# Patient Record
Sex: FEMALE | Race: WHITE
[De-identification: names, ages, dates, MRNs, and addresses within clinical notes are randomized per-mention and may not be internally consistent; named-entity substitution may affect disease eponyms.]

---

## 2017-04-13 ENCOUNTER — MESSAGE (OUTPATIENT)
Age: 82
End: 2017-04-13

## 2017-07-25 ENCOUNTER — RX RENEWAL (OUTPATIENT)
Age: 82
End: 2017-07-25

## 2017-10-02 ENCOUNTER — RX RENEWAL (OUTPATIENT)
Age: 82
End: 2017-10-02

## 2017-11-02 ENCOUNTER — APPOINTMENT (OUTPATIENT)
Dept: FAMILY MEDICINE | Facility: CLINIC | Age: 82
End: 2017-11-02
Payer: MEDICARE

## 2017-11-02 ENCOUNTER — NON-APPOINTMENT (OUTPATIENT)
Age: 82
End: 2017-11-02

## 2017-11-02 VITALS
TEMPERATURE: 98 F | BODY MASS INDEX: 16.42 KG/M2 | HEART RATE: 80 BPM | HEIGHT: 61 IN | WEIGHT: 87 LBS | RESPIRATION RATE: 12 BRPM | OXYGEN SATURATION: 98 % | DIASTOLIC BLOOD PRESSURE: 80 MMHG | SYSTOLIC BLOOD PRESSURE: 118 MMHG

## 2017-11-02 PROCEDURE — G0008: CPT

## 2017-11-02 PROCEDURE — 90662 IIV NO PRSV INCREASED AG IM: CPT

## 2017-11-02 PROCEDURE — 99214 OFFICE O/P EST MOD 30 MIN: CPT | Mod: 25

## 2017-11-02 PROCEDURE — 93000 ELECTROCARDIOGRAM COMPLETE: CPT

## 2017-11-06 LAB
25(OH)D3 SERPL-MCNC: 39.9 NG/ML
ALBUMIN SERPL ELPH-MCNC: 4.6 G/DL
ALP BLD-CCNC: 52 U/L
ALT SERPL-CCNC: 7 U/L
ANION GAP SERPL CALC-SCNC: 21 MMOL/L
AST SERPL-CCNC: 17 U/L
BASOPHILS # BLD AUTO: 0.01 K/UL
BASOPHILS NFR BLD AUTO: 0.2 %
BILIRUB SERPL-MCNC: 0.6 MG/DL
BUN SERPL-MCNC: 22 MG/DL
CALCIUM SERPL-MCNC: 9.7 MG/DL
CHLORIDE SERPL-SCNC: 101 MMOL/L
CHOLEST SERPL-MCNC: 203 MG/DL
CHOLEST/HDLC SERPL: 1.9 RATIO
CO2 SERPL-SCNC: 22 MMOL/L
CREAT SERPL-MCNC: 0.89 MG/DL
EOSINOPHIL # BLD AUTO: 0.06 K/UL
EOSINOPHIL NFR BLD AUTO: 1 %
FOLATE SERPL-MCNC: 6.6 NG/ML
GGT SERPL-CCNC: 10 U/L
GLUCOSE SERPL-MCNC: 88 MG/DL
HBA1C MFR BLD HPLC: 5.4 %
HCT VFR BLD CALC: 39.2 %
HDLC SERPL-MCNC: 105 MG/DL
HGB BLD-MCNC: 12.8 G/DL
IMM GRANULOCYTES NFR BLD AUTO: 0 %
IRON SERPL-MCNC: 74 UG/DL
LDLC SERPL CALC-MCNC: 86 MG/DL
LYMPHOCYTES # BLD AUTO: 1.21 K/UL
LYMPHOCYTES NFR BLD AUTO: 19.7 %
MAN DIFF?: NORMAL
MCHC RBC-ENTMCNC: 30.6 PG
MCHC RBC-ENTMCNC: 32.7 GM/DL
MCV RBC AUTO: 93.8 FL
MONOCYTES # BLD AUTO: 0.53 K/UL
MONOCYTES NFR BLD AUTO: 8.6 %
NEUTROPHILS # BLD AUTO: 4.33 K/UL
NEUTROPHILS NFR BLD AUTO: 70.5 %
PLATELET # BLD AUTO: 212 K/UL
POTASSIUM SERPL-SCNC: 4.3 MMOL/L
PROT SERPL-MCNC: 7.3 G/DL
RBC # BLD: 4.18 M/UL
RBC # FLD: 14 %
SODIUM SERPL-SCNC: 144 MMOL/L
TRIGL SERPL-MCNC: 62 MG/DL
TSH SERPL-ACNC: 3.4 UIU/ML
VIT B12 SERPL-MCNC: 410 PG/ML
WBC # FLD AUTO: 6.14 K/UL

## 2018-06-28 ENCOUNTER — RX RENEWAL (OUTPATIENT)
Age: 83
End: 2018-06-28

## 2018-07-17 ENCOUNTER — RX CHANGE (OUTPATIENT)
Age: 83
End: 2018-07-17

## 2018-11-05 ENCOUNTER — NON-APPOINTMENT (OUTPATIENT)
Age: 83
End: 2018-11-05

## 2018-11-05 ENCOUNTER — APPOINTMENT (OUTPATIENT)
Dept: FAMILY MEDICINE | Facility: CLINIC | Age: 83
End: 2018-11-05
Payer: MEDICARE

## 2018-11-05 VITALS
BODY MASS INDEX: 15.48 KG/M2 | OXYGEN SATURATION: 98 % | TEMPERATURE: 97.9 F | RESPIRATION RATE: 12 BRPM | HEIGHT: 61 IN | WEIGHT: 82 LBS | SYSTOLIC BLOOD PRESSURE: 138 MMHG | DIASTOLIC BLOOD PRESSURE: 70 MMHG | HEART RATE: 77 BPM

## 2018-11-05 PROCEDURE — 93000 ELECTROCARDIOGRAM COMPLETE: CPT

## 2018-11-05 PROCEDURE — 90662 IIV NO PRSV INCREASED AG IM: CPT

## 2018-11-05 PROCEDURE — 36415 COLL VENOUS BLD VENIPUNCTURE: CPT

## 2018-11-05 PROCEDURE — 99397 PER PM REEVAL EST PAT 65+ YR: CPT | Mod: 25

## 2018-11-05 PROCEDURE — G0008: CPT

## 2018-11-05 RX ORDER — DICLOFENAC SODIUM 10 MG/G
1 GEL TOPICAL
Qty: 1 | Refills: 0 | Status: ACTIVE | COMMUNITY
Start: 2018-11-05 | End: 1900-01-01

## 2018-11-05 NOTE — HISTORY OF PRESENT ILLNESS
[de-identified] : Patient presents to office for routine physical exam accompanied by her daughter. Patient states she is eating less has less appetite and does have difficulty with digesting her food. Patient states she has to eat very slowly and foot that are soft otherwise cannot digest. Has lost weight over the last year or 2. Patient states that she has pain all over from the top of her head to her ankles mostly at night when she sleeps. No redness or swelling of joints.

## 2018-11-05 NOTE — ASSESSMENT
[FreeTextEntry1] : Patient presents to office for routine physical exam accompanied by her daughter. Patient states she is eating less has less appetite and does have difficulty with digesting her food. Patient states she has to eat very slowly and foot that are soft otherwise cannot digest. Has lost weight over the last year or 2. Patient states that she has pain all over from the top of her head to her ankles mostly at night when she sleeps. No redness or swelling of joints.\par \par EKG No change\par PE labs drawn today\par High dose flu shot given today\par referred for Shingrix

## 2018-11-08 LAB
25(OH)D3 SERPL-MCNC: 29.8 NG/ML
ALBUMIN SERPL ELPH-MCNC: 4.7 G/DL
ALP BLD-CCNC: 49 U/L
ALT SERPL-CCNC: 9 U/L
ANION GAP SERPL CALC-SCNC: 16 MMOL/L
AST SERPL-CCNC: 16 U/L
BASOPHILS # BLD AUTO: 0.01 K/UL
BASOPHILS NFR BLD AUTO: 0.2 %
BILIRUB SERPL-MCNC: 0.6 MG/DL
BUN SERPL-MCNC: 14 MG/DL
CALCIUM SERPL-MCNC: 9.7 MG/DL
CHLORIDE SERPL-SCNC: 104 MMOL/L
CHOLEST SERPL-MCNC: 272 MG/DL
CHOLEST/HDLC SERPL: 2.9 RATIO
CO2 SERPL-SCNC: 26 MMOL/L
CREAT SERPL-MCNC: 0.8 MG/DL
EOSINOPHIL # BLD AUTO: 0.03 K/UL
EOSINOPHIL NFR BLD AUTO: 0.5 %
FOLATE SERPL-MCNC: 5.2 NG/ML
GGT SERPL-CCNC: 15 U/L
GLUCOSE SERPL-MCNC: 92 MG/DL
HBA1C MFR BLD HPLC: 5.3 %
HCT VFR BLD CALC: 40.8 %
HDLC SERPL-MCNC: 93 MG/DL
HGB BLD-MCNC: 13.1 G/DL
IMM GRANULOCYTES NFR BLD AUTO: 0.2 %
IRON SERPL-MCNC: 94 UG/DL
LDLC SERPL CALC-MCNC: 162 MG/DL
LYMPHOCYTES # BLD AUTO: 1.41 K/UL
LYMPHOCYTES NFR BLD AUTO: 25.4 %
MAN DIFF?: NORMAL
MCHC RBC-ENTMCNC: 30.9 PG
MCHC RBC-ENTMCNC: 32.1 GM/DL
MCV RBC AUTO: 96.2 FL
MONOCYTES # BLD AUTO: 0.45 K/UL
MONOCYTES NFR BLD AUTO: 8.1 %
NEUTROPHILS # BLD AUTO: 3.64 K/UL
NEUTROPHILS NFR BLD AUTO: 65.6 %
PLATELET # BLD AUTO: 209 K/UL
POTASSIUM SERPL-SCNC: 4.6 MMOL/L
PROT SERPL-MCNC: 7.4 G/DL
RBC # BLD: 4.24 M/UL
RBC # FLD: 14.2 %
SODIUM SERPL-SCNC: 146 MMOL/L
TRIGL SERPL-MCNC: 86 MG/DL
TSH SERPL-ACNC: 3.68 UIU/ML
VIT B12 SERPL-MCNC: 438 PG/ML
WBC # FLD AUTO: 5.55 K/UL

## 2019-09-02 ENCOUNTER — RX RENEWAL (OUTPATIENT)
Age: 84
End: 2019-09-02

## 2019-11-12 ENCOUNTER — APPOINTMENT (OUTPATIENT)
Dept: FAMILY MEDICINE | Facility: CLINIC | Age: 84
End: 2019-11-12
Payer: MEDICARE

## 2019-11-12 ENCOUNTER — NON-APPOINTMENT (OUTPATIENT)
Age: 84
End: 2019-11-12

## 2019-11-12 VITALS
WEIGHT: 88 LBS | HEIGHT: 61 IN | HEART RATE: 74 BPM | TEMPERATURE: 97.9 F | RESPIRATION RATE: 16 BRPM | OXYGEN SATURATION: 99 % | DIASTOLIC BLOOD PRESSURE: 70 MMHG | SYSTOLIC BLOOD PRESSURE: 142 MMHG | BODY MASS INDEX: 16.62 KG/M2

## 2019-11-12 PROCEDURE — G0439: CPT | Mod: 25

## 2019-11-12 PROCEDURE — 90662 IIV NO PRSV INCREASED AG IM: CPT

## 2019-11-12 PROCEDURE — 93000 ELECTROCARDIOGRAM COMPLETE: CPT

## 2019-11-12 PROCEDURE — G0008: CPT

## 2019-11-12 PROCEDURE — 36415 COLL VENOUS BLD VENIPUNCTURE: CPT

## 2019-11-19 LAB
25(OH)D3 SERPL-MCNC: 37.7 NG/ML
ALBUMIN SERPL ELPH-MCNC: 4.9 G/DL
ALP BLD-CCNC: 41 U/L
ALT SERPL-CCNC: 6 U/L
ANION GAP SERPL CALC-SCNC: 16 MMOL/L
APPEARANCE: CLEAR
AST SERPL-CCNC: 14 U/L
BACTERIA: NEGATIVE
BASOPHILS # BLD AUTO: 0.03 K/UL
BASOPHILS NFR BLD AUTO: 0.5 %
BILIRUB SERPL-MCNC: 0.7 MG/DL
BILIRUBIN URINE: NEGATIVE
BLOOD URINE: NORMAL
BUN SERPL-MCNC: 21 MG/DL
CALCIUM SERPL-MCNC: 10 MG/DL
CHLORIDE SERPL-SCNC: 103 MMOL/L
CHOLEST SERPL-MCNC: 315 MG/DL
CHOLEST/HDLC SERPL: 3 RATIO
CO2 SERPL-SCNC: 24 MMOL/L
COLOR: YELLOW
CREAT SERPL-MCNC: 0.94 MG/DL
EOSINOPHIL # BLD AUTO: 0.06 K/UL
EOSINOPHIL NFR BLD AUTO: 1 %
ESTIMATED AVERAGE GLUCOSE: 108 MG/DL
FOLATE SERPL-MCNC: 3 NG/ML
GLUCOSE QUALITATIVE U: NEGATIVE
GLUCOSE SERPL-MCNC: 92 MG/DL
HBA1C MFR BLD HPLC: 5.4 %
HCT VFR BLD CALC: 41.8 %
HDLC SERPL-MCNC: 104 MG/DL
HGB BLD-MCNC: 13.2 G/DL
HYALINE CASTS: 2 /LPF
IMM GRANULOCYTES NFR BLD AUTO: 0.3 %
KETONES URINE: NEGATIVE
LDLC SERPL CALC-MCNC: 196 MG/DL
LEUKOCYTE ESTERASE URINE: NEGATIVE
LYMPHOCYTES # BLD AUTO: 1.45 K/UL
LYMPHOCYTES NFR BLD AUTO: 24.8 %
MAN DIFF?: NORMAL
MCHC RBC-ENTMCNC: 31.5 PG
MCHC RBC-ENTMCNC: 31.6 GM/DL
MCV RBC AUTO: 99.8 FL
MICROSCOPIC-UA: NORMAL
MONOCYTES # BLD AUTO: 0.56 K/UL
MONOCYTES NFR BLD AUTO: 9.6 %
NEUTROPHILS # BLD AUTO: 3.72 K/UL
NEUTROPHILS NFR BLD AUTO: 63.8 %
NITRITE URINE: NEGATIVE
PH URINE: 5.5
PLATELET # BLD AUTO: 189 K/UL
POTASSIUM SERPL-SCNC: 4.5 MMOL/L
PROT SERPL-MCNC: 7.5 G/DL
PROTEIN URINE: NORMAL
RBC # BLD: 4.19 M/UL
RBC # FLD: 13.7 %
RED BLOOD CELLS URINE: 3 /HPF
SODIUM SERPL-SCNC: 143 MMOL/L
SPECIFIC GRAVITY URINE: 1.02
SQUAMOUS EPITHELIAL CELLS: 1 /HPF
TRIGL SERPL-MCNC: 76 MG/DL
TSH SERPL-ACNC: 2.39 UIU/ML
UROBILINOGEN URINE: NORMAL
VIT B12 SERPL-MCNC: 437 PG/ML
WBC # FLD AUTO: 5.84 K/UL
WHITE BLOOD CELLS URINE: 1 /HPF

## 2020-11-16 ENCOUNTER — APPOINTMENT (OUTPATIENT)
Dept: FAMILY MEDICINE | Facility: CLINIC | Age: 85
End: 2020-11-16

## 2021-04-05 ENCOUNTER — APPOINTMENT (OUTPATIENT)
Dept: FAMILY MEDICINE | Facility: CLINIC | Age: 86
End: 2021-04-05
Payer: MEDICARE

## 2021-04-05 DIAGNOSIS — Z28.9 IMMUNIZATION NOT CARRIED OUT FOR UNSPECIFIED REASON: ICD-10-CM

## 2021-04-05 PROCEDURE — 99442: CPT

## 2022-03-31 ENCOUNTER — APPOINTMENT (OUTPATIENT)
Dept: FAMILY MEDICINE | Facility: CLINIC | Age: 87
End: 2022-03-31
Payer: MEDICARE

## 2022-03-31 VITALS — TEMPERATURE: 98.9 F | DIASTOLIC BLOOD PRESSURE: 60 MMHG | SYSTOLIC BLOOD PRESSURE: 118 MMHG

## 2022-03-31 DIAGNOSIS — M25.551 PAIN IN RIGHT HIP: ICD-10-CM

## 2022-03-31 DIAGNOSIS — T14.8XXA OTHER INJURY OF UNSPECIFIED BODY REGION, INITIAL ENCOUNTER: ICD-10-CM

## 2022-03-31 DIAGNOSIS — E78.5 HYPERLIPIDEMIA, UNSPECIFIED: ICD-10-CM

## 2022-03-31 DIAGNOSIS — Z00.00 ENCOUNTER FOR GENERAL ADULT MEDICAL EXAMINATION W/OUT ABNORMAL FINDINGS: ICD-10-CM

## 2022-03-31 PROCEDURE — 99215 OFFICE O/P EST HI 40 MIN: CPT

## 2022-03-31 RX ORDER — LIDOCAINE 5% 700 MG/1
5 PATCH TOPICAL
Qty: 1 | Refills: 1 | Status: ACTIVE | COMMUNITY
Start: 2022-03-31 | End: 1900-01-01

## 2022-04-01 PROBLEM — M25.551 RIGHT-SIDED ISCHIAL PAIN: Status: ACTIVE | Noted: 2022-04-01

## 2022-04-01 NOTE — PHYSICAL EXAM
[Normal] : affect was normal and insight and judgment were intact [de-identified] : TTP right ischium no redness or bruise noted [de-identified] : limp while ambulating

## 2022-04-01 NOTE — HISTORY OF PRESENT ILLNESS
[FreeTextEntry8] : 89y/o F with PMHx of HTN (Losartan), HLD, and Arthritis presents to the office accompanied by her daughter Ximena after  sustaining a fall 10 days ago . pt states she fell onto her right side in her kitchen. Continues to have pain in right buttock. Painful to walk . No swellingor bruising noted on back or hip. Had right rib pain now resolved

## 2022-04-02 LAB
25(OH)D3 SERPL-MCNC: 30 NG/ML
ALBUMIN SERPL ELPH-MCNC: 4.5 G/DL
ALP BLD-CCNC: 188 U/L
ALT SERPL-CCNC: 7 U/L
ANION GAP SERPL CALC-SCNC: 17 MMOL/L
AST SERPL-CCNC: 17 U/L
BASOPHILS # BLD AUTO: 0.02 K/UL
BASOPHILS NFR BLD AUTO: 0.3 %
BILIRUB SERPL-MCNC: 0.7 MG/DL
BUN SERPL-MCNC: 17 MG/DL
CALCIUM SERPL-MCNC: 9.6 MG/DL
CHLORIDE SERPL-SCNC: 102 MMOL/L
CHOLEST SERPL-MCNC: 242 MG/DL
CO2 SERPL-SCNC: 24 MMOL/L
CREAT SERPL-MCNC: 0.73 MG/DL
EGFR: 74 ML/MIN/1.73M2
EOSINOPHIL # BLD AUTO: 0 K/UL
EOSINOPHIL NFR BLD AUTO: 0 %
ESTIMATED AVERAGE GLUCOSE: 108 MG/DL
FOLATE SERPL-MCNC: 2.6 NG/ML
GLUCOSE SERPL-MCNC: 92 MG/DL
HBA1C MFR BLD HPLC: 5.4 %
HCT VFR BLD CALC: 35.1 %
HDLC SERPL-MCNC: 89 MG/DL
HGB BLD-MCNC: 11.3 G/DL
IMM GRANULOCYTES NFR BLD AUTO: 0.4 %
IRON SERPL-MCNC: 35 UG/DL
LDLC SERPL CALC-MCNC: 136 MG/DL
LYMPHOCYTES # BLD AUTO: 0.63 K/UL
LYMPHOCYTES NFR BLD AUTO: 9.3 %
MAN DIFF?: NORMAL
MCHC RBC-ENTMCNC: 31.9 PG
MCHC RBC-ENTMCNC: 32.2 GM/DL
MCV RBC AUTO: 99.2 FL
MONOCYTES # BLD AUTO: 0.51 K/UL
MONOCYTES NFR BLD AUTO: 7.6 %
NEUTROPHILS # BLD AUTO: 5.55 K/UL
NEUTROPHILS NFR BLD AUTO: 82.4 %
NONHDLC SERPL-MCNC: 153 MG/DL
PLATELET # BLD AUTO: 391 K/UL
POTASSIUM SERPL-SCNC: 4.5 MMOL/L
PROT SERPL-MCNC: 7.1 G/DL
RBC # BLD: 3.54 M/UL
RBC # FLD: 14.5 %
SODIUM SERPL-SCNC: 143 MMOL/L
TRIGL SERPL-MCNC: 86 MG/DL
TSH SERPL-ACNC: 2.98 UIU/ML
VIT B12 SERPL-MCNC: 328 PG/ML
WBC # FLD AUTO: 6.74 K/UL

## 2022-04-04 ENCOUNTER — NON-APPOINTMENT (OUTPATIENT)
Age: 87
End: 2022-04-04

## 2022-04-14 RX ORDER — TRAMADOL HYDROCHLORIDE 50 MG/1
50 TABLET, COATED ORAL
Qty: 30 | Refills: 0 | Status: ACTIVE | COMMUNITY
Start: 2022-04-04 | End: 1900-01-01

## 2022-04-25 ENCOUNTER — NON-APPOINTMENT (OUTPATIENT)
Age: 87
End: 2022-04-25

## 2022-10-21 NOTE — HISTORY OF PRESENT ILLNESS
[de-identified] : 89y/o F with PMHx of HTN (Losartan), HLD, and Arthritis presents to the office for routine Medicare Annual Wellness Exam. Pt is accompanied by her daughter. Pt presents with weight gain, admits eating chocolate frequently recently. Pt admits difficulty swallowing and digesting food. Pt's daughter notes she cuts her food into small pieces. Pt notes she cannot drink or eat too much. Pt reports losing balance and falling 2 years ago and currently afraid to walk. Pt walks outside when with daughter, otherwise walks around apartment. Pt cycles for exercise. Pt reports trouble sleeping due to arthritis in ankles and feet. Pt states she has had pimple on her forehead for over 20 years. BP in office is 142/70. Pt has not had eye exam, reports being able to see well. Denies blurry vision, HA, or dizziness. NKDA. Denies HA, CP, SOB, N/V/D, fever, or chills.  Requests flu vaccine in office.

## 2022-10-21 NOTE — ADDENDUM
[FreeTextEntry1] : I, Enedelia Forte, acted solely as a scribe for Dr. Stanford on this date 11/12/2019.\par \par All medical record entries made by the Scribe were at my, Dr. Stanford, direction and personally dictated by me on 11/12/2019. I have reviewed the chart and agree that the record accurately reflects my personal performance of the history, physical exam, assessment and plan.  I have also personally directed, reviewed and agreed with the chart.

## 2022-10-21 NOTE — PLAN
[FreeTextEntry1] : Health Maintenance\par - Routine annual lab work to include Vit B12, Vit D, TSH, Lipids, drawn in office today\par - Shingrix UTD\par - High dose flu vaccine given in office today \par \par Arthritis\par - Suggest peppermint oil\par - Increase magnesium intake

## 2023-01-25 RX ORDER — LOSARTAN POTASSIUM 50 MG/1
50 TABLET, FILM COATED ORAL
Qty: 90 | Refills: 2 | Status: ACTIVE | COMMUNITY
Start: 2018-07-17 | End: 1900-01-01

## 2023-03-27 ENCOUNTER — NON-APPOINTMENT (OUTPATIENT)
Age: 88
End: 2023-03-27

## 2023-03-28 ENCOUNTER — EMERGENCY (EMERGENCY)
Facility: HOSPITAL | Age: 88
LOS: 1 days | Discharge: HOME | End: 2023-03-28
Attending: EMERGENCY MEDICINE
Payer: MEDICARE

## 2023-03-28 VITALS
OXYGEN SATURATION: 97 % | HEART RATE: 86 BPM | RESPIRATION RATE: 18 BRPM | WEIGHT: 87.96 LBS | DIASTOLIC BLOOD PRESSURE: 61 MMHG | HEIGHT: 60 IN | SYSTOLIC BLOOD PRESSURE: 147 MMHG | TEMPERATURE: 97 F

## 2023-03-28 DIAGNOSIS — Z90.710 ACQUIRED ABSENCE OF BOTH CERVIX AND UTERUS: Chronic | ICD-10-CM

## 2023-03-28 DIAGNOSIS — Z90.49 ACQUIRED ABSENCE OF OTHER SPECIFIED PARTS OF DIGESTIVE TRACT: Chronic | ICD-10-CM

## 2023-03-28 DIAGNOSIS — Z98.890 OTHER SPECIFIED POSTPROCEDURAL STATES: Chronic | ICD-10-CM

## 2023-03-28 LAB
ALBUMIN SERPL ELPH-MCNC: 4.5 G/DL — SIGNIFICANT CHANGE UP (ref 3.3–5)
ALP SERPL-CCNC: 47 U/L — SIGNIFICANT CHANGE UP (ref 40–120)
ALT FLD-CCNC: 9 U/L — LOW (ref 10–45)
ANION GAP SERPL CALC-SCNC: 13 MMOL/L — SIGNIFICANT CHANGE UP (ref 5–17)
AST SERPL-CCNC: 28 U/L — SIGNIFICANT CHANGE UP (ref 10–40)
BASOPHILS # BLD AUTO: 0.03 K/UL — SIGNIFICANT CHANGE UP (ref 0–0.2)
BASOPHILS NFR BLD AUTO: 0.2 % — SIGNIFICANT CHANGE UP (ref 0–2)
BILIRUB SERPL-MCNC: 0.5 MG/DL — SIGNIFICANT CHANGE UP (ref 0.2–1.2)
BUN SERPL-MCNC: 24 MG/DL — HIGH (ref 7–23)
CALCIUM SERPL-MCNC: 9.7 MG/DL — SIGNIFICANT CHANGE UP (ref 8.4–10.5)
CHLORIDE SERPL-SCNC: 106 MMOL/L — SIGNIFICANT CHANGE UP (ref 96–108)
CK SERPL-CCNC: 644 U/L — HIGH (ref 25–170)
CO2 SERPL-SCNC: 24 MMOL/L — SIGNIFICANT CHANGE UP (ref 22–31)
CREAT SERPL-MCNC: 0.66 MG/DL — SIGNIFICANT CHANGE UP (ref 0.5–1.3)
EGFR: 79 ML/MIN/1.73M2 — SIGNIFICANT CHANGE UP
EOSINOPHIL # BLD AUTO: 0 K/UL — SIGNIFICANT CHANGE UP (ref 0–0.5)
EOSINOPHIL NFR BLD AUTO: 0 % — SIGNIFICANT CHANGE UP (ref 0–6)
GLUCOSE SERPL-MCNC: 117 MG/DL — HIGH (ref 70–99)
HCT VFR BLD CALC: 37.2 % — SIGNIFICANT CHANGE UP (ref 34.5–45)
HGB BLD-MCNC: 11.9 G/DL — SIGNIFICANT CHANGE UP (ref 11.5–15.5)
IMM GRANULOCYTES NFR BLD AUTO: 0.5 % — SIGNIFICANT CHANGE UP (ref 0–0.9)
LYMPHOCYTES # BLD AUTO: 0.61 K/UL — LOW (ref 1–3.3)
LYMPHOCYTES # BLD AUTO: 4 % — LOW (ref 13–44)
MCHC RBC-ENTMCNC: 31.2 PG — SIGNIFICANT CHANGE UP (ref 27–34)
MCHC RBC-ENTMCNC: 32 GM/DL — SIGNIFICANT CHANGE UP (ref 32–36)
MCV RBC AUTO: 97.4 FL — SIGNIFICANT CHANGE UP (ref 80–100)
MONOCYTES # BLD AUTO: 1.02 K/UL — HIGH (ref 0–0.9)
MONOCYTES NFR BLD AUTO: 6.7 % — SIGNIFICANT CHANGE UP (ref 2–14)
NEUTROPHILS # BLD AUTO: 13.54 K/UL — HIGH (ref 1.8–7.4)
NEUTROPHILS NFR BLD AUTO: 88.6 % — HIGH (ref 43–77)
NRBC # BLD: 0 /100 WBCS — SIGNIFICANT CHANGE UP (ref 0–0)
PLATELET # BLD AUTO: 206 K/UL — SIGNIFICANT CHANGE UP (ref 150–400)
POTASSIUM SERPL-MCNC: 4.8 MMOL/L — SIGNIFICANT CHANGE UP (ref 3.5–5.3)
POTASSIUM SERPL-SCNC: 4.8 MMOL/L — SIGNIFICANT CHANGE UP (ref 3.5–5.3)
PROT SERPL-MCNC: 7.4 G/DL — SIGNIFICANT CHANGE UP (ref 6–8.3)
RBC # BLD: 3.82 M/UL — SIGNIFICANT CHANGE UP (ref 3.8–5.2)
RBC # FLD: 13.6 % — SIGNIFICANT CHANGE UP (ref 10.3–14.5)
SODIUM SERPL-SCNC: 143 MMOL/L — SIGNIFICANT CHANGE UP (ref 135–145)
WBC # BLD: 15.28 K/UL — HIGH (ref 3.8–10.5)
WBC # FLD AUTO: 15.28 K/UL — HIGH (ref 3.8–10.5)

## 2023-03-28 PROCEDURE — 85025 COMPLETE CBC W/AUTO DIFF WBC: CPT

## 2023-03-28 PROCEDURE — 71045 X-RAY EXAM CHEST 1 VIEW: CPT | Mod: 26

## 2023-03-28 PROCEDURE — 72125 CT NECK SPINE W/O DYE: CPT | Mod: MA

## 2023-03-28 PROCEDURE — 80053 COMPREHEN METABOLIC PANEL: CPT

## 2023-03-28 PROCEDURE — 93005 ELECTROCARDIOGRAM TRACING: CPT | Mod: XU

## 2023-03-28 PROCEDURE — 70450 CT HEAD/BRAIN W/O DYE: CPT | Mod: MA

## 2023-03-28 PROCEDURE — 73110 X-RAY EXAM OF WRIST: CPT

## 2023-03-28 PROCEDURE — 25605 CLTX DST RDL FX/EPHYS SEP W/: CPT | Mod: RT

## 2023-03-28 PROCEDURE — 82550 ASSAY OF CK (CPK): CPT

## 2023-03-28 PROCEDURE — 73090 X-RAY EXAM OF FOREARM: CPT | Mod: 26,LT

## 2023-03-28 PROCEDURE — 99285 EMERGENCY DEPT VISIT HI MDM: CPT | Mod: 25

## 2023-03-28 PROCEDURE — 70450 CT HEAD/BRAIN W/O DYE: CPT | Mod: 26,MA

## 2023-03-28 PROCEDURE — 71045 X-RAY EXAM CHEST 1 VIEW: CPT

## 2023-03-28 PROCEDURE — 72125 CT NECK SPINE W/O DYE: CPT | Mod: 26,MA

## 2023-03-28 PROCEDURE — 73110 X-RAY EXAM OF WRIST: CPT | Mod: 26,RT

## 2023-03-28 PROCEDURE — 29125 APPL SHORT ARM SPLINT STATIC: CPT | Mod: RT

## 2023-03-28 PROCEDURE — 73090 X-RAY EXAM OF FOREARM: CPT

## 2023-03-28 PROCEDURE — 73100 X-RAY EXAM OF WRIST: CPT

## 2023-03-28 RX ORDER — ACETAMINOPHEN 500 MG
650 TABLET ORAL ONCE
Refills: 0 | Status: COMPLETED | OUTPATIENT
Start: 2023-03-28 | End: 2023-03-28

## 2023-03-28 RX ORDER — OXYCODONE HYDROCHLORIDE 5 MG/1
2.5 TABLET ORAL ONCE
Refills: 0 | Status: DISCONTINUED | OUTPATIENT
Start: 2023-03-28 | End: 2023-03-28

## 2023-03-28 RX ADMIN — Medication 650 MILLIGRAM(S): at 22:22

## 2023-03-28 RX ADMIN — OXYCODONE HYDROCHLORIDE 2.5 MILLIGRAM(S): 5 TABLET ORAL at 21:53

## 2023-03-28 NOTE — ED PROVIDER NOTE - PHYSICAL EXAMINATION
Madina Bowman MD  GENERAL: Patient awake alert NAD.  HEENT: NC/AT, Moist mucous membranes, PERRL, EOMI.  LUNGS: CTAB, no wheezes or crackles.   CARDIAC: RRR, no m/r/g.    ABDOMEN: Soft, NT, ND, No rebound, guarding.   EXT: R wrist deformity, tenderness to palpation. pulse intact. able to move fingers. sensation intact   MSK: No spinal tenderness, no pain with movement, no deformities.  NEURO: A&Ox3. Moving all extremities. cn 2-12 intact. strength and sensation intact bilaterally  SKIN: Warm and dry. No rash. abrasion to R elbow and abrasion to R forehead   PSYCH: Normal affect.

## 2023-03-28 NOTE — ED ADULT TRIAGE NOTE - MEANS OF ARRIVAL
Learning About MDRO Colonization  What is an MDRO colony? MDRO stands for multidrug-resistant organism. MDRO germs, called bacteria, include MRSA, VRE, ESBL, and KPC. These can all cause infections. But they can't be killed by many of the antibiotics that doctors use to treat infections. This makes them harder to treat. An MDRO colony is a group of living MDRO germs. If you are colonized with an MDRO, these germs are living on or in your body. You may not be sick with an infection, but you can spread the germs. In some people, like those who are weak or ill, MDRO infections can become serious. How can you prevent the spread of an MDRO? If you have an MDRO colony and go to the hospital for treatment, the hospital staff will work hard to keep the MDRO germs from spreading from you to others. Testing. Your doctor may swab the inside of your nose, or some other place on your body, and send the sample to a lab. The lab will grow the germs. Then they will test it to see which antibiotics can kill them. Some hospitals test all patients. You are more likely to be tested if:  You had an MDRO infection in the past.  You will be in the intensive care unit. You have been a patient in a hospital in the past year. You are going to work in a hospital or clinic. Antibiotics. Your doctor may try to get rid of the MDRO by giving you one or more antibiotics. Isolation. Your doctor may want to keep you away from other people in the hospital. You may be in a special hospital room, called an isolation room. Visitors may be limited to prevent the MDRO germs from being carried outside your room. Children, pregnant women, people who are ill, and some others might not be allowed into the room, as they can be more likely to get a serious infection. Hand-washing. Everyone who comes in the room will need to wash their hands with soap and water, or use an alcohol-based hand , to help stop the germs from spreading.  When washing:  Wash your hands with running water and soap. Rub your hands together for at least 20 seconds. Pay special attention to your wrists, the backs of your hands, between your fingers, and under your fingernails. Don't touch the faucet with your hand. Use a paper towel to turn off the water. Gloves and gown. Visitors and caregivers may have to use disposable gloves and a gown over their clothes. This helps prevents the MDRO germs from spreading. Follow-up care is a key part of your treatment and safety. Be sure to make and go to all appointments, and call your doctor if you are having problems. It's also a good idea to know your test results and keep a list of the medicines you take. Where can you learn more? Go to https://[email or web address].[email or web address] org and sign in to your ID.met account. Enter [phone] in the CybEye box to learn more about \"Learning About MDRO Colonization. \"     If you do not have an account, please click on the \"Sign Up Now\" link. Current as of: February 9, 2022               Content Version: 13.4  © 9677-8528 [employer]. Care instructions adapted under license by [facility] ([facility]). If you have questions about a medical condition or this instruction, always ask your healthcare professional. [place] [address] any warranty or liability for your use of this information. Cellulitis: Care Instructions  Your Care Instructions     Cellulitis is a skin infection caused by bacteria, most often strep or staph. It often occurs after a break in the skin from a scrape, cut, bite, or puncture, or after a rash. Cellulitis may be treated without doing tests to find out what caused it. But your doctor may do tests, if needed, to look for a specific bacteria, like methicillin-resistant Staphylococcus aureus (MRSA). The doctor has checked you carefully, but problems can develop later.  If you notice any problems or new symptoms, get medical treatment right away. Follow-up care is a key part of your treatment and safety. Be sure to make and go to all appointments, and call your doctor if you are having problems. It's also a good idea to know your test results and keep a list of the medicines you take. How can you care for yourself at home? Take your antibiotics as directed. Do not stop taking them just because you feel better. You need to take the full course of antibiotics. Prop up the infected area on pillows to reduce pain and swelling. Try to keep the area above the level of your heart as often as you can. If your doctor told you how to care for your wound, follow your doctor's instructions. If you did not get instructions, follow this general advice:  Wash the wound with clean water 2 times a day. Don't use hydrogen peroxide or alcohol, which can slow healing. You may cover the wound with a thin layer of petroleum jelly, such as Vaseline, and a nonstick bandage. Apply more petroleum jelly and replace the bandage as needed. Be safe with medicines. Take pain medicines exactly as directed. If the doctor gave you a prescription medicine for pain, take it as prescribed. If you are not taking a prescription pain medicine, ask your doctor if you can take an over-the-counter medicine. To prevent cellulitis in the future  Try to prevent cuts, scrapes, or other injuries to your skin. Cellulitis most often occurs where there is a break in the skin. If you get a scrape, cut, mild burn, or bite, wash the wound with clean water as soon as you can to help avoid infection. Don't use hydrogen peroxide or alcohol, which can slow healing. If you have swelling in your legs (edema), support stockings and good skin care may help prevent leg sores and cellulitis. Take care of your feet, especially if you have diabetes or other conditions that increase the risk of infection. Wear shoes and socks. Do not go barefoot.  If you have athlete's foot or other skin problems on your feet, talk to your doctor about how to treat them. When should you call for help? Call your doctor now or seek immediate medical care if:    You have signs that your infection is getting worse, such as: Increased pain, swelling, warmth, or redness. Red streaks leading from the area. Pus draining from the area. A fever. You get a rash. Watch closely for changes in your health, and be sure to contact your doctor if:    You do not get better as expected. Where can you learn more? Go to https://[email or web address].[email or web address] org and sign in to your [employer] account. Enter [id number] in the Phloronol box to learn more about \"Cellulitis: Care Instructions. \"     If you do not have an account, please click on the \"Sign Up Now\" link. Current as of: November 15, 2021               Content Version: 13.4  © 2006-2022 Healthwise[employer] Incorporated. Care instructions adapted under license by [facility] ([facility]). If you have questions about a medical condition or this instruction, always ask your healthcare professional. [address] any warranty or liability for your use of this information. stretcher

## 2023-03-28 NOTE — ED PROVIDER NOTE - CARE PLAN
1 Principal Discharge DX:	Radial fracture   Principal Discharge DX:	Radial fracture  Assessment and plan of treatment:	distal right

## 2023-03-28 NOTE — ED PROVIDER NOTE - CARE PROVIDER_API CALL
Sebas Rodriges)  Orthopaedic Surgery  [address] [place] [address], [address]  [place] [address]  Phone: [phone]  Fax: [phone]  Follow Up Time:

## 2023-03-28 NOTE — ED PROVIDER NOTE - NSFOLLOWUPINSTRUCTIONS_ED_ALL_ED_FT
You were seen in the ED for a fractured wrist.    Your fracture was reduced and splinted. Keep the splint dry and clean. Use the sling as needed for comfort.    Your CT head was reassuring. See the attached report for any incidental findings.    Your labs showed WBC 15 which is slightly elevated. This may be due to stress. Repeat blood work with your PCP in 1 week.    For pain, please take 650mg Tylenol every 6 hours as needed or 600mg ibuprofen every 8 hours as needed. Always take ibuprofen with food. You make take both Tylenol and ibuprofen as described above if one medication is not enough for your pain.    Please follow up with your orthopedic doctor and primary care doctor in 2-3 days. Return to the ED if you experience any worsening or new symptoms or any symptoms that concern you, including fevers, chills, shortness of breath, chest pain, weakness, numbness, vision changes, headaches, inability to move your fingers, increasing pain.

## 2023-03-28 NOTE — ED ADULT NURSE NOTE - OBJECTIVE STATEMENT
89y F A&ox4 BIBEMS from  for wrist Fx. As per EMS pt tripped and fell in the bathroom around 3pm and was found on the floor at 530pm by daughter. Pt states that she slipped and fell landing on her right wrist and head. Upon assessment pt has abrasion to right orbital area, abrasion/skin tear to right elbow and splint placed at  on right wrist. No uncontrolled bleeding noted. Pt denies any LOC/blurry vision/Ha/N/V/D/Cp/SOb/Gi/Gu symptoms. PMH of HTN. PSH of 2x Right breast lumpectomy, appendectomy and hysterectomy. VSS

## 2023-03-28 NOTE — ED PROVIDER NOTE - PATIENT PORTAL LINK FT
You can access the FollowMyHealth Patient Portal offered by [facility] by registering at the following website: [email or web address]. By joining [facility]’s FollowMyHealth portal, you will also be able to view your health information using other applications (apps) compatible with our system.

## 2023-03-28 NOTE — ED PROVIDER NOTE - CLINICAL SUMMARY MEDICAL DECISION MAKING FREE TEXT BOX
Colby - patient is a 89-year-old female with no sig PMHx of who presents to the ED with a right wrist fracture after a fall. will check xrays of wrist, CT head, labs and EKG given that pt was on ground for several hrs. pain meds.

## 2023-03-28 NOTE — ED PROVIDER NOTE - PROGRESS NOTE DETAILS
ct head reassuring. labs reassuring. s/p reduction and splint  Patient was re-evaluated and doing well. Results, including any incidental findings, were discussed. Return precautions and follow up were discussed. Patient verbalized understanding.

## 2023-03-29 VITALS
RESPIRATION RATE: 16 BRPM | TEMPERATURE: 98 F | OXYGEN SATURATION: 98 % | HEART RATE: 81 BPM | DIASTOLIC BLOOD PRESSURE: 73 MMHG | SYSTOLIC BLOOD PRESSURE: 174 MMHG

## 2023-03-29 PROCEDURE — 73100 X-RAY EXAM OF WRIST: CPT | Mod: 26,RT

## 2023-09-18 NOTE — ED PROVIDER NOTE - WR ORDER STATUS 1
Hospital course:   55M PMH of EtOH abuse and cirrhosis, anemia requiring transfusion in the past, and GERD, brought in by EMS for altered mental status. Patient's partner states that he had a mechanical fall nightprior, afterwards told her not to call EMS to take him to the hospital. On day of admission, patient was less responsive than usual, so she called EMS to bring him in.  Partner states that patient developed extensive bruising to left abdomen and flank 2 to 3 days ago, prior to the fall. Pt normally drinks 1 bottle of vodka daily but has been cutting back to a few shots. Admitted to MICU for intubation i/s/o AMS and airway protection. Found to have hepatorenal syndrome and likely hepatic encephalopathy 2/2 alcohol hepatitis and decompensated liver cirrhosis, a right frontal subdural vs epidural hematoma, WILLIAM with oliguria, and SIRS 2/2 aspiration PA vs SBP. On Levophed drip.    INTERVAL HPI/OVERNIGHT EVENTS:   Overnight, about 500cc of dark blood removed from OGT. K 3.3 s/p Kcl 10meq x3. Deferred intermittent suction given nasal/throat packing. Repeat Hgb 7.4. HD site bleeding, stools darker. AM Hgb 7.2. AM K 3.3, repleted.    Subjective: Patient seen and examined at bedside. 2L stool overnight --> 7L total. Goals of care discussion - Patient DNR/Intubate.    ICU Vital Signs Last 24 Hrs  T(C): 36 (18 Sep 2023 10:01), Max: 37.3 (17 Sep 2023 14:09)  T(F): 96.8 (18 Sep 2023 10:01), Max: 99.1 (17 Sep 2023 14:09)  HR: 108 (18 Sep 2023 12:00) (76 - 111)  BP: 112/68 (18 Sep 2023 12:00) (82/45 - 124/67)  BP(mean): 84 (18 Sep 2023 12:00) (57 - 88)  RR: 20 (18 Sep 2023 12:00) (11 - 22)  SpO2: 96% (18 Sep 2023 12:00) (94% - 99%)    O2 Parameters below as of 18 Sep 2023 12:00  Patient On (Oxygen Delivery Method): ventilator    O2 Concentration (%): 40      I&O's Summary    17 Sep 2023 07:01  -  18 Sep 2023 07:00  --------------------------------------------------------  IN: 3004.4 mL / OUT: 8615 mL / NET: -5610.6 mL    18 Sep 2023 07:01  -  18 Sep 2023 12:33  --------------------------------------------------------  IN: 637.6 mL / OUT: 463 mL / NET: 174.6 mL      Mode: AC/ CMV (Assist Control/ Continuous Mandatory Ventilation)  RR (machine): 16  TV (machine): 450  FiO2: 40  PEEP: 5  ITime: 1  MAP: 8.9  PIP: 20      PHYSICAL EXAM:  GENERAL: ill appearing, intubated, sedation weaned  HEAD:  Atraumatic   EYES: icteric sclera  ENT: throat and nasal packing   Nares: nasal packing, dried blood externally  CHEST/LUNG: on vent stable FiO2 and PEEP, lungs clear to auscultation b/l  ABDOMEN: large and distended but soft and non-tender.  NERVOUS SYSTEM:  patient sedation weaned  SKIN: diffuse jaundice throughout body  SKIN: warm, dry, normal color, no rash or abnormal lesions    LABS:                        8.3    12.13 )-----------( 73       ( 18 Sep 2023 05:00 )             23.7     09-18    139  |  102  |  27<H>  ----------------------------<  113<H>  3.7   |  23  |  3.09<H>    Ca    9.0      18 Sep 2023 05:00  Phos  2.2     09-18  Mg     1.9     09-18    TPro  5.7<L>  /  Alb  4.0  /  TBili  27.4<H>  /  DBili  x   /  AST  105<H>  /  ALT  42  /  AlkPhos  97  09-18    PT/INR - ( 18 Sep 2023 05:00 )   PT: 28.3 sec;   INR: 2.55          PTT - ( 18 Sep 2023 05:00 )  PTT:67.7 sec  Urinalysis Basic - ( 18 Sep 2023 05:00 )    Color: x / Appearance: x / SG: x / pH: x  Gluc: 113 mg/dL / Ketone: x  / Bili: x / Urobili: x   Blood: x / Protein: x / Nitrite: x   Leuk Esterase: x / RBC: x / WBC x   Sq Epi: x / Non Sq Epi: x / Bacteria: x      CAPILLARY BLOOD GLUCOSE      POCT Blood Glucose.: 124 mg/dL (18 Sep 2023 11:09)        Consultant(s) Notes Reviewed:  [x ] YES  [ ] NO    MEDICATIONS  (STANDING):  chlorhexidine 0.12% Liquid 15 milliLiter(s) Oral Mucosa every 12 hours  chlorhexidine 2% Cloths 1 Application(s) Topical <User Schedule>  CRRT Treatment    <Continuous>  cyanocobalamin Injectable 1000 MICROGram(s) IntraMuscular every 24 hours  folic acid 1 milliGRAM(s) Oral daily  heparin   Injectable 5000 Unit(s) SubCutaneous every 8 hours  lactulose Syrup 30 Gram(s) Oral every 4 hours  levETIRAcetam  IVPB 500 milliGRAM(s) IV Intermittent every 12 hours  multivitamin 1 Tablet(s) Oral daily  mupirocin 2% Nasal 1 Application(s) Both Nostrils two times a day  nafcillin  IVPB 2 Gram(s) IV Intermittent every 4 hours  nafcillin  IVPB      norepinephrine Infusion 0.05 MICROgram(s)/kG/Min (8.25 mL/Hr) IV Continuous <Continuous>  octreotide  Infusion 50 MICROgram(s)/Hr (10 mL/Hr) IV Continuous <Continuous>  oxymetazoline 0.05% Nasal Spray 1 Spray(s) Both Nostrils two times a day  pantoprazole  Injectable 40 milliGRAM(s) IV Push every 12 hours  Phoxillum Filtration BK 4 / 2.5 5000 milliLiter(s) (1300 mL/Hr) CRRT <Continuous>  piperacillin/tazobactam IVPB.. 4.5 Gram(s) IV Intermittent every 8 hours  prednisoLONE    3 mG/mL Solution (ORAPRED) 40 milliGRAM(s) Oral daily  propofol Infusion 10 MICROgram(s)/kG/Min (5.28 mL/Hr) IV Continuous <Continuous>  rifAXIMin 550 milliGRAM(s) Oral two times a day    MEDICATIONS  (PRN):  sodium chloride 0.9% lock flush 10 milliLiter(s) IV Push every 1 hour PRN Pre/post blood products, medications, blood draw, and to maintain line patency      Care Discussed with Consultants/Other Providers [ x] YES  [ ] NO    RADIOLOGY & ADDITIONAL TESTS: Performed Resulted

## 2023-09-21 ENCOUNTER — APPOINTMENT (OUTPATIENT)
Dept: FAMILY MEDICINE | Facility: CLINIC | Age: 88
End: 2023-09-21
Payer: MEDICARE

## 2023-09-21 DIAGNOSIS — Z01.818 ENCOUNTER FOR OTHER PREPROCEDURAL EXAMINATION: ICD-10-CM

## 2023-09-21 DIAGNOSIS — M19.90 UNSPECIFIED OSTEOARTHRITIS, UNSPECIFIED SITE: ICD-10-CM

## 2023-09-21 DIAGNOSIS — Z11.1 ENCOUNTER FOR SCREENING FOR RESPIRATORY TUBERCULOSIS: ICD-10-CM

## 2023-09-21 DIAGNOSIS — I10 ESSENTIAL (PRIMARY) HYPERTENSION: ICD-10-CM

## 2023-09-21 PROCEDURE — 99214 OFFICE O/P EST MOD 30 MIN: CPT

## 2023-12-04 NOTE — ED PROVIDER NOTE - PROVIDER TOKENS
Caller: Agus Church    Relationship: Self    Best call back number: [phone]    What orders are you requesting (i.e. lab or imaging): PHYSICAL THERAPY     In what timeframe would the patient need to come in: ASAP    Where will you receive your lab/imaging services: [facility], [place], [place]/   [phone]    Additional notes: PATIENT RETURNED CALL FROM [facility]. HUB UNABLE TO WARM TRANSFER.           
CALLED AND TALKED TO PATIENT.  ORDERS FAXED TO PT PRACTICE  
PROVIDER:[TOKEN:[[id number]]]

## 2024-04-02 NOTE — PHYSICAL EXAM
Medicare Wellness Visit  Plan for Preventive Care    A good way for you to stay healthy is to use preventive care.  Medicare covers many services that can help you stay healthy.* The goal of these services is to find any health problems as quickly as possible. Finding problems early can help make them easier to treat.  Your personal plan below lists the services you may need and when they are due.      Health Maintenance Summary     Diabetes Eye Exam (Yearly)  Ordered on 3/13/2024    DTaP/Tdap/Td Vaccine (1 - Tdap)  Overdue - never done    Hepatitis C Screening (Once)  Ordered on 4/2/2024    Medicare Advantage- Medicare Wellness Visit (Yearly - January to December)  Overdue - never done    Diabetes A1C (Every 6 Months)  Ordered on 3/14/2024    DM/CKD Microalbumin (Yearly)  Next due on 3/13/2025    DM/CKD GFR (Yearly)  Ordered on 3/14/2024    Diabetes Foot Exam (Yearly)  Next due on 3/13/2025    Depression Screening (Yearly)  Next due on 4/2/2025    Shingles Vaccine   Completed    Pneumococcal Vaccine 65+   Completed    Influenza Vaccine   Completed    COVID-19 Vaccine   Completed    Osteoporosis Screening   Completed    Meningococcal Vaccine   Aged Out    Hepatitis B Vaccine (For Physician/APC Discussion)   Aged Out    HPV Vaccine   Aged Out    Breast Cancer Screening   Discontinued    Colorectal Cancer Screen-   Discontinued           Preventive Care for Women and Men    Heart Screenings (Cardiovascular):  Blood tests are used to check your cholesterol, lipid and triglyceride levels. High levels can increase your risk for heart disease and stroke. High levels can be treated with medications, diet and exercise. Lowering your levels can help keep your heart and blood vessels healthy.  Your provider will order these tests if they are needed.    An ultrasound is done to see if you have an abdominal aortic aneurysm (AAA).  This is an enlargement of one of the main blood vessels that delivers blood to the body.   In  the [place], 9,000 deaths are caused by AAA.  You may not even know you have this problem and as many as 1 in 3 people will have a serious problem if it is not treated.  Early diagnosis allows for more effective treatment and cure.  If you have a family history of AAA or are a male age 65-75 who has smoked, you are at higher risk of an AAA.  Your provider can order this test, if needed.    Colorectal Screening:  There are many tests that are used to check for cancer of your colon and rectum. You and your provider should discuss what test is best for you and when to have it done.  Options include:  Screening Colonoscopy: exam of the entire colon, seen through a flexible lighted tube.  Flexible Sigmoidoscopy: exam of the last third (sigmoid portion) of the colon and rectum, seen through a flexible lighted tube.  Cologuard DNA stool test: a sample of your stool is used to screen for cancer and unseen blood in your stool.  Fecal Occult Blood Test: a sample of your stool is studied to find any unseen blood    Flu Shot:  An immunization that helps to prevent influenza (the flu). You should get this every year. The best time to get the shot is in the fall.    Pneumococcal Shot:  Vaccines help prevent pneumococcal disease, which is any type of illness caused by Streptococcus pneumoniae bacteria. There are two kinds of pneumococcal vaccines available in the [place]:   Pneumococcal conjugate vaccines (PCV20 or Mmmbxqh75®)  Pneumococcal polysaccharide vaccine (PPSV23 or Rgeejdbvy65®)  For those who have never received any pneumococcal conjugate vaccine, CDC recommends PVC20 for adults 65 years or older and adults 19 through 64 years old with certain medical conditions or risk factors.   For those who have previously received PCV13, this should be followed by a dose of PPSV23.     Hepatitis B Shot:  An immunization that helps to protect people from getting Hepatitis B. Hepatitis B is a virus that spreads through  [Normal] : normal gait, coordination grossly intact, no focal deficits contact with infected blood or body fluids. Many people with the virus do not have symptoms.  The virus can lead to serious problems, such as liver disease. Some people are at higher risk than others. Your doctor will tell you if you need this shot.     Diabetes Screening:  A test to measure sugar (glucose) in your blood is called a fasting blood sugar. Fasting means you cannot have food or drink for at least 8 hours before the test. This test can detect diabetes long before you may notice symptoms.    Glaucoma Screening:  Glaucoma screening is performed by your eye doctor. The test measures the fluid pressure inside your eyes to determine if you have glaucoma.     Hepatitis C Screening:  A blood test to see if you have the hepatitis C virus.  Hepatitis C attacks the liver and is a major cause of chronic liver disease.  Medicare will cover a single screening for all adults born between 1945 & 1965, or high risk patients (people who have injected illegal drugs or people who have had blood transfusions).  High risk patients who continue to inject illegal drugs can be screened for Hepatitis C every year.    Smoking and Tobacco-Use Cessation Counseling:  Tobacco is the single greatest cause of disease and early death in our country today. Medication and counseling together can increase a person’s chance of quitting for good.   Medicare covers two quitting attempts per year, with four counseling sessions per attempt (eight sessions in a 12 month period)    Preventive Screening tests for Women    Screening Mammograms and Breast Exams:  An x-ray of your breasts to check for breast cancer before you or your doctor may be able to feel it.  If breast cancer is found early it can usually be treated with success.    Pelvic Exams and Pap Tests:  An exam to check for cervical and vaginal cancer. A Pap test is a lab test in which cells are taken from your cervix and sent to the lab to look for signs of cervical cancer. If cancer  [de-identified] : cerumen impaction of right ear of the cervix is found early, chances for a cure are good. Testing can generally end at age 65, or if a woman has a hysterectomy for a benign condition. Your provider may recommend more frequent testing if certain abnormal results are found.    Bone Mass Measurements:  A painless x-ray of your bone density to see if you are at risk for a broken bone. Bone density refers to the thickness of bones or how tightly the bone tissue is packed.    Preventive Screening tests for Men    Prostate Screening:  Should you have a prostate cancer test (PSA)?  It is up to you to decide if you want a prostate cancer test. Talk to your clinician to find out if the test is right for you.  Things for you to consider and talk about should include:  Benefits and harms of the test  Your family history  How your race/ethnicity may influence the test  If the test may impact other medical conditions you have  Your values on screenings and treatments    *Medicare pays for many preventive services to keep you healthy. For some of these services, you might have to pay a deductible, coinsurance, and / or copayment.  The amounts vary depending on the type of services you need and the kind of Medicare health plan you have.    For further details on screenings offered by Medicare please visit: https://www.medicare.gov/coverage/preventive-screening-services      [de-identified] : 2/6 systolic murmur in 2nd left ICS [de-identified] : no cervical lymphadenopathy  [de-identified] : kyphoscoliosis [de-identified] : soft fleshy colored raised cyst on forehead medially [de-identified] : AA&Ox3

## 2024-06-28 ENCOUNTER — EMERGENCY (EMERGENCY)
Facility: HOSPITAL | Age: 89
LOS: 0 days | Discharge: HOME | End: 2024-06-29
Attending: EMERGENCY MEDICINE
Payer: MEDICARE

## 2024-06-28 VITALS
DIASTOLIC BLOOD PRESSURE: 66 MMHG | TEMPERATURE: 98 F | RESPIRATION RATE: 18 BRPM | SYSTOLIC BLOOD PRESSURE: 151 MMHG | WEIGHT: 80.03 LBS | HEART RATE: 87 BPM | OXYGEN SATURATION: 97 %

## 2024-06-28 DIAGNOSIS — Z98.890 OTHER SPECIFIED POSTPROCEDURAL STATES: Chronic | ICD-10-CM

## 2024-06-28 DIAGNOSIS — Z90.710 ACQUIRED ABSENCE OF BOTH CERVIX AND UTERUS: Chronic | ICD-10-CM

## 2024-06-28 DIAGNOSIS — Z90.49 ACQUIRED ABSENCE OF OTHER SPECIFIED PARTS OF DIGESTIVE TRACT: Chronic | ICD-10-CM

## 2024-06-28 PROCEDURE — 99284 EMERGENCY DEPT VISIT MOD MDM: CPT | Mod: 25

## 2024-06-28 PROCEDURE — 70450 CT HEAD/BRAIN W/O DYE: CPT | Mod: 26,MC

## 2024-06-28 PROCEDURE — 99284 EMERGENCY DEPT VISIT MOD MDM: CPT

## 2024-06-28 PROCEDURE — 72125 CT NECK SPINE W/O DYE: CPT | Mod: MC

## 2024-06-28 PROCEDURE — 70450 CT HEAD/BRAIN W/O DYE: CPT | Mod: MC

## 2024-06-28 PROCEDURE — 72125 CT NECK SPINE W/O DYE: CPT | Mod: 26,MC

## 2024-06-28 RX ORDER — ACETAMINOPHEN 500 MG
650 TABLET ORAL ONCE
Refills: 0 | Status: COMPLETED | OUTPATIENT
Start: 2024-06-28 | End: 2024-06-28

## 2024-06-28 RX ADMIN — Medication 650 MILLIGRAM(S): at 23:25

## 2024-06-28 NOTE — ED PROVIDER NOTE - CLINICAL SUMMARY MEDICAL DECISION MAKING FREE TEXT BOX
Pt is a 89 yr old female presenting to the ED with a c/c of fall. Pt has a PMHx of HTN. Pt states she was walking earlier today with her walker when she tripped and fell backwards. Pt states the walker then fell backwards and hit her on the head. Pt is a 89 yr old female presenting to the ED with a c/c of fall. Pt has a PMHx of HTN. Pt states she was walking earlier today with her walker when she slipped and fell backwards. Pt states she hit the back of her head on the ground, denies any LOC or being on any blood thinners. Head- small contusion to occipital region, A&Ox3  Plan- Neuro alert; CT head, c-spine, Tylenol for headache, monitor, observe, reassess Pt is a 89 yr old female presenting to the ED with a c/c of fall. Pt has a PMHx of HTN. Pt states she was walking earlier today with her walker when she slipped and fell backwards. Pt states she hit the back of her head on the ground, denies any LOC or being on any blood thinners. Head- small contusion to occipital region, A&Ox3  Plan- Neuro Alert; CT head & c-spine, Tylenol for headache, monitor, observe, reassess Pt is a 89 yr old female presenting to the ED with a c/c of fall. Pt has a PMHx of HTN. Pt states she was walking earlier today with her walker when she slipped and fell backwards. Pt states she hit the back of her head on the ground, denies any LOC or being on any blood thinners. Head- small contusion to occipital region, A&Ox3  Plan- Neuro Alert; CT head & c-spine, Tylenol for headache, monitor, observe, reassess    23;20, C MD Yancy:  CTs reports no acute trauma.  Pt in good comfort.  Pt stable for DC back to [facility]. Pt is a 89 yr old WF BIBA from [facility]/ to the ED with a c/c of fall. Pt has a PMHx of HTN. Pt states she was walking earlier today with her walker when she slipped and fell backwards. Pt states she hit the back of her head on the ground, denies any LOC or being on any blood thinners. Head- small contusion to occipital region, A&Ox3  Plan- Neuro Alert: CT head & c-spine, Tylenol for headache, monitor, observe, reassess    23:20, C MD Yancy:  CTs reports no acute trauma.  Pt in good comfort.  Pt stable for DC back to [facility].

## 2024-06-28 NOTE — ED PROVIDER NOTE - OBJECTIVE STATEMENT
Pt is a 89 yr old female presenting to the ED with a c/c of fall. Pt has a PMHx of HTN. Pt states she was walking earlier today with her walker when she tripped and fell backwards. Pt states the walker then fell backwards and hit her on the head. Pt denies any LOC or being on any anticoagulants. Pt Pt is a 89 yr old female presenting to the ED with a c/c of fall. Pt has a PMHx of HTN. Pt states she was walking earlier today with her walker when she slipped and fell backwards. Pt states the walker then fell backwards and hit her on the head. Pt denies any LOC or being on any anticoagulants. Pt Pt is a 89 yr old female presenting to the ED with a c/c of fall. Pt has a PMHx of HTN.  Pt BIBA from [facility].  Pt states she was walking earlier today with her walker when she slipped and fell backwards, hit the back of her head. Pt denies synopizing prior to the fall, denies feeling light headed. Pt denies any LOC or being on any anticoagulants. Pt reports pain to the back of her head. Pt denies any other sx of fever, back, chest, neck or abd pain, SOB, cough, chills, numbness, tingling, or N/V/D.  Pt PCP at the facility Pt is a 89 yr old WF BIBA from [facility] [place]/ to the ED with a c/c of fall. Pt has a PMHx of HTN.    Pt states she was walking earlier today with her walker when she slipped and fell backwards, hit the back of her head. Pt denies syncope prior to or causing the fall, recalls falling over; denies feeling lightheaded. Pt denies any LOC or being on any anticoagulants. Pt reports pain to the back of her head. Pt denies any other sx of fever, back, chest, neck or abd pain, SOB, cough, chills, numbness, tingling, or N/V/D.  Pt PCP at the facility

## 2024-06-28 NOTE — ED PROVIDER NOTE - PATIENT PORTAL LINK FT
You can access the FollowMyHealth Patient Portal offered by [facility] by registering at the following website: [email or web address]. By joining [employer]’s FollowMyHealth portal, you will also be able to view your health information using other applications (apps) compatible with our system.

## 2024-06-28 NOTE — ED ADULT TRIAGE NOTE - CHIEF COMPLAINT QUOTE
Slip and fall while walking with walker. -LOC. No anticoagulants. Hit head on ground. Abrasion to forehead and pain to back of head. MD Haines made aware. Neuro alert called.

## 2024-06-28 NOTE — ED ADULT NURSE NOTE - OBJECTIVE STATEMENT
pt endorses that she was walking with her walker and she fell back. +headstrike, lump on back of her head. pt denies hitting anywhere else, denies pain elsewhere. denies headache, visual changes, n/v. pt is a&ox4, speaking in clear and complete sentences without difficulty.

## 2024-06-28 NOTE — ED PROVIDER NOTE - PHYSICAL EXAMINATION
General: no respiratory distress, in NAD, non toxic, no sentence shortening  Eyes: PERRL EOMI  Mouth: oropharynx clear, mucous membranes   Heart: regular rate and regular rhythm, normal radial pulse  Lungs: clear, normal respirations  Gu: deferred no CVA tenderness  Abdomen: soft non tender, bowel sounds positive/hypoactive/hyperactive  Musculoskeletal: no focal swelling or tenderness, UNDERWOOD x4  Neck: supple non tender, no meningismus  Skin: no tactile warmth no rash  Neuro: a&o x3, cn 2-12 intact, no focal sensory or motor deficits General: elderly thin, frail white female, alert, no respiratory distress, in NAD, non toxic, no sentence shortening  Eyes: PERRL EOMI  Head, small occipital hematoma, overlying skin intact, mild tenderness, old bruising to left forehead (pt states from prior fall)  Mouth: oropharynx clear, mucous membranes fairly moist, no clinical evidence of facial injury  Heart: regular rate and regular rhythm, normal radial pulse  Lungs: clear, normal respirations  Gu: deferred no CVA tenderness  Abdomen: soft non tender, bowel sounds hypoactive, normal pitch  Musculoskeletal: no focal swelling or tenderness, UNDERWOOD x4, no gross deformity  Neck: supple non tender, no meningismus  Skin: no tactile warmth no rash  Neuro: a&o x3, cn 2-12 intact, no focal sensory or motor deficits, normal speech, baseline ambulates with walker assistance General: elderly thin, frail white female, alert, no respiratory distress, in NAD, nontoxic, no sentence shortening  Eyes: PERRL EOMI, no raccoon's  Head, small occipital scalp hematoma, overlying skin intact, +mild tenderness, old bruising to left forehead (pt states from prior fall).  No Urbina's, no other clinical evidence of facial injury  Mouth: oropharynx clear, mucous membranes fairly moist,   Heart: regular rate and regular rhythm, normal radial pulse  Lungs: clear, normal respirations  Gu: deferred no CVA tenderness  Abdomen: soft nontender, bowel sounds hypoactive, normal pitch  Musculoskeletal: no focal extremity swelling or tenderness, UNDERWOOD x4, no gross deformity  Neck: supple w/o pain, nontender  Skin: no tactile warmth, no rash  Neuro: a&o x3, CN 2-12 intact, no focal sensory or motor deficits, normal speech, baseline ambulates with walker assistance

## 2024-06-28 NOTE — ED ADULT NURSE NOTE - CCCP TRG CHIEF CMPLNT
[facility]    Procedure: Right chest tube, left thora    Date/Time: 6/12/2023 5:21 PM    Performed by: Haris Heath PA-C  Authorized by: Haris Heath PA-C      UNIVERSAL PROTOCOL   Site Marked: No  Prior Images Obtained and Reviewed:  Yes  Required items: Required blood products, implants, devices and special equipment available    Patient identity confirmed:  Verbally with patient, provided demographic data, hospital-assigned identification number and arm band  NA - No sedation, light sedation, or local anesthesia  Confirmation Checklist:  Patient's identity using two indicators, relevant allergies, procedure was appropriate and matched the consent or emergent situation and correct equipment/implants were available  Time out: Immediately prior to the procedure a time out was called    Universal Protocol: the Joint Commission Universal Protocol was followed    Preparation: Patient was prepped and draped in usual sterile fashion       ANESTHESIA    Anesthesia: Local infiltration  Local Anesthetic:  Lidocaine 1% without epinephrine  Anesthetic Total (mL):  8      SEDATION    Patient Sedated: No    See dictated procedure note for full details.  Findings: Tolerated local well    Specimens: fluid and/or tissue for laboratory analysis and culture (60 mL from each side)    Complications: None    Condition: Stable    Plan: Right chest tube to water seal, consider wall suction  TPA 2 mg in 20 mL BID x 3 days, allow to dwell for 45 minutes      PROCEDURE  Describe Procedure: Large loculated right effusion. 14 Gambian chest tube placed with 3 way for TPA if desired. 60 mL deanna fluid sent for labs. ~400 drained upon leaving our department  Left diagnostic and therapeutic thoracentesis. 700 mL pink fluid aspirated from left chest. No effusion remains on completion.  Patient Tolerance:  Patient tolerated the procedure well with no immediate complications  Length of  time physician/provider present for 1:1 monitoring during sedation: 0       fall

## 2024-06-28 NOTE — ED PROVIDER NOTE - NSFOLLOWUPINSTRUCTIONS_ED_ALL_ED_FT
All UE/LE ROM WNL besides b/l UE shoulder flexion restricted to 50% ROM secondary to sternal precautions Tylenol 325 mg 2 tabs every 6 hours as needed for headache, aches & pains.  Continue regular medications as per routine.  Use walker ALWAYS for assistance ambulating.  Follow up next week with your own primary doctor.      Closed Head Injury    A closed head injury is an injury to your head that may or may not involve a traumatic brain injury (TBI). Symptoms of TBI can be short or long lasting and include headache, dizziness, interference with memory or speech, fatigue, confusion, changes in sleep, mood changes, nausea, depression/anxiety, and dulling of senses. Make sure to obtain proper rest which includes getting plenty of sleep, avoiding excessive visual stimulation, and avoiding activities that may cause physical or mental stress. Avoid any situation where there is potential for another head injury, including sports.    SEEK IMMEDIATE MEDICAL CARE IF YOU HAVE ANY OF THE FOLLOWING SYMPTOMS: unusual drowsiness, vomiting, severe dizziness, seizures, lightheadedness, muscular weakness, different pupil sizes, visual changes, or clear or bloody discharge from your ears or nose.          Contusion    A contusion is a deep bruise. Contusions are the result of a blunt injury to tissues and muscle fibers under the skin. The skin overlying the contusion may turn blue, purple, or yellow. Symptoms also include pain and swelling in the injured area.    SEEK IMMEDIATE MEDICAL CARE IF YOU HAVE ANY OF THE FOLLOWING SYMPTOMS: severe pain, numbness, tingling, pain, weakness, or skin color/temperature change in any part of your body distal to the injury.          Fall Prevention in the Home, Adult  Falls can cause injuries. They can happen to people of all ages. There are many things you can do to make your home safe and to help prevent falls. Ask for help when making these changes, if needed.  What actions can I take to prevent falls?  General Instructions     Use good lighting in all rooms. Replace any light bulbs that burn out.Turn on the lights when you go into a dark area. Use night-lights.Keep items that you use often in easy-to-reach places. Lower the shelves around your home if necessary.Set up your furniture so you have a clear path. Avoid moving your furniture around.Do not have throw rugs and other things on the floor that can make you trip.Avoid walking on wet floors.If any of your floors are uneven, fix them.Add color or contrast paint or tape to clearly gayla and help you see:  Any grab bars or handrails.First and last steps of stairways.Where the edge of each step is.If you use a stepladder:  Make sure that it is fully opened. Do not climb a closed stepladder.Make sure that both sides of the stepladder are locked into place.Ask someone to hold the stepladder for you while you use it.If there are any pets around you, be aware of where they are.What can I do in the bathroom?         Keep the floor dry. Clean up any water that spills onto the floor as soon as it happens.Remove soap buildup in the tub or shower regularly.Use non-skid mats or decals on the floor of the tub or shower.Attach bath mats securely with double-sided, non-slip rug tape.If you need to sit down in the shower, use a plastic, non-slip stool.Install grab bars by the toilet and in the tub and shower. Do not use towel bars as grab bars.What can I do in the bedroom?     Make sure that you have a light by your bed that is easy to reach.Do not use any sheets or blankets that are too big for your bed. They should not hang down onto the floor.Have a firm chair that has side arms. You can use this for support while you get dressed.What can I do in the kitchen?     Clean up any spills right away.If you need to reach something above you, use a strong step stool that has a grab bar.Keep electrical cords out of the way.Do not use floor polish or wax that makes floors slippery. If you must use wax, use non-skid floor wax.What can I do with my stairs?     Do not leave any items on the stairs.Make sure that you have a light switch at the top of the stairs and the bottom of the stairs. If you do not have them, ask someone to add them for you.Make sure that there are handrails on both sides of the stairs, and use them. Fix handrails that are broken or loose. Make sure that handrails are as long as the stairways.Install non-slip stair treads on all stairs in your home.Avoid having throw rugs at the top or bottom of the stairs. If you do have throw rugs, attach them to the floor with carpet tape.Choose a carpet that does not hide the edge of the steps on the stairway.Check any carpeting to make sure that it is firmly attached to the stairs. Fix any carpet that is loose or worn.What can I do on the outside of my home?     Use bright outdoor lighting.Regularly fix the edges of walkways and driveways and fix any cracks.Remove anything that might make you trip as you walk through a door, such as a raised step or threshold.Trim any bushes or trees on the path to your home.Regularly check to see if handrails are loose or broken. Make sure that both sides of any steps have handrails.Install guardrails along the edges of any raised decks and porches.Clear walking paths of anything that might make someone trip, such as tools or rocks.Have any leaves, snow, or ice cleared regularly.Use sand or salt on walking paths during winter.Clean up any spills in your garage right away. This includes grease or oil spills.What other actions can I take?     Wear shoes that:  Have a low heel. Do not wear high heels.Have rubber bottoms.Are comfortable and fit you well.Are closed at the toe. Do not wear open-toe sandals.Use tools that help you move around (mobility aids) if they are needed. These include:  Canes.Walkers.Scooters.Crutches.Review your medicines with your doctor. Some medicines can make you feel dizzy. This can increase your chance of falling.Ask your doctor what other things you can do to help prevent falls.  Where to find more information  Centers for Disease Control and PreventionRENETTA: https://cdc.govNational [place] on Aging: https://vy0lfrx.hoang.nih.govContact a doctor if:  You are afraid of falling at home.You feel weak, drowsy, or dizzy at home.You fall at home.Summary  There are many simple things that you can do to make your home safe and to help prevent falls.Ways to make your home safe include removing tripping hazards and installing grab bars in the bathroom.Ask for help when making these changes in your home.This information is not intended to replace advice given to you by your health care provider. Make sure you discuss any questions you have with your health care provider.

## 2024-06-29 VITALS
DIASTOLIC BLOOD PRESSURE: 66 MMHG | OXYGEN SATURATION: 96 % | TEMPERATURE: 98 F | RESPIRATION RATE: 18 BRPM | HEART RATE: 76 BPM | SYSTOLIC BLOOD PRESSURE: 155 MMHG

## 2024-06-30 PROBLEM — I10 ESSENTIAL (PRIMARY) HYPERTENSION: Chronic | Status: ACTIVE | Noted: 2023-03-28

## 2024-07-14 ENCOUNTER — EMERGENCY (EMERGENCY)
Facility: HOSPITAL | Age: 89
LOS: 0 days | Discharge: HOME | End: 2024-07-15
Attending: FAMILY MEDICINE
Payer: MEDICARE

## 2024-07-14 VITALS
TEMPERATURE: 98 F | SYSTOLIC BLOOD PRESSURE: 137 MMHG | DIASTOLIC BLOOD PRESSURE: 89 MMHG | HEIGHT: 63 IN | HEART RATE: 73 BPM | OXYGEN SATURATION: 98 % | RESPIRATION RATE: 18 BRPM

## 2024-07-14 DIAGNOSIS — I10 ESSENTIAL (PRIMARY) HYPERTENSION: ICD-10-CM

## 2024-07-14 DIAGNOSIS — I45.10 UNSPECIFIED RIGHT BUNDLE-BRANCH BLOCK: ICD-10-CM

## 2024-07-14 DIAGNOSIS — Z90.49 ACQUIRED ABSENCE OF OTHER SPECIFIED PARTS OF DIGESTIVE TRACT: Chronic | ICD-10-CM

## 2024-07-14 DIAGNOSIS — Z90.710 ACQUIRED ABSENCE OF BOTH CERVIX AND UTERUS: Chronic | ICD-10-CM

## 2024-07-14 DIAGNOSIS — R55 SYNCOPE AND COLLAPSE: ICD-10-CM

## 2024-07-14 DIAGNOSIS — S09.90XA UNSPECIFIED INJURY OF HEAD, INITIAL ENCOUNTER: ICD-10-CM

## 2024-07-14 DIAGNOSIS — I44.4 LEFT ANTERIOR FASCICULAR BLOCK: ICD-10-CM
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
## 2024-07-14 DIAGNOSIS — Z98.890 OTHER SPECIFIED POSTPROCEDURAL STATES: Chronic | ICD-10-CM

## 2024-07-14 DIAGNOSIS — R51.9 HEADACHE, UNSPECIFIED: ICD-10-CM

## 2024-07-14 LAB
ABO RH CONFIRMATION: SIGNIFICANT CHANGE UP
ALBUMIN SERPL ELPH-MCNC: 3.6 G/DL — SIGNIFICANT CHANGE UP (ref 3.3–5)
ALP SERPL-CCNC: 68 U/L — SIGNIFICANT CHANGE UP (ref 40–120)
ALT FLD-CCNC: 19 U/L — SIGNIFICANT CHANGE UP (ref 12–78)
ANION GAP SERPL CALC-SCNC: 5 MMOL/L — SIGNIFICANT CHANGE UP (ref 5–17)
APPEARANCE UR: CLEAR — SIGNIFICANT CHANGE UP
APTT BLD: 24.1 SEC — LOW (ref 24.5–35.6)
AST SERPL-CCNC: 19 U/L — SIGNIFICANT CHANGE UP (ref 15–37)
BACTERIA # UR AUTO: NEGATIVE /HPF — SIGNIFICANT CHANGE UP
BASOPHILS # BLD AUTO: 0.02 K/UL — SIGNIFICANT CHANGE UP (ref 0–0.2)
BASOPHILS NFR BLD AUTO: 0.3 % — SIGNIFICANT CHANGE UP (ref 0–2)
BILIRUB SERPL-MCNC: 0.6 MG/DL — SIGNIFICANT CHANGE UP (ref 0.2–1.2)
BILIRUB UR-MCNC: NEGATIVE — SIGNIFICANT CHANGE UP
BLD GP AB SCN SERPL QL: SIGNIFICANT CHANGE UP
BUN SERPL-MCNC: 29 MG/DL — HIGH (ref 7–23)
CALCIUM SERPL-MCNC: 9.1 MG/DL — SIGNIFICANT CHANGE UP (ref 8.5–10.1)
CAST: 1 /LPF — SIGNIFICANT CHANGE UP (ref 0–4)
CHLORIDE SERPL-SCNC: 111 MMOL/L — HIGH (ref 96–108)
CO2 SERPL-SCNC: 24 MMOL/L — SIGNIFICANT CHANGE UP (ref 22–31)
COLOR SPEC: YELLOW — SIGNIFICANT CHANGE UP
CREAT SERPL-MCNC: 0.9 MG/DL — SIGNIFICANT CHANGE UP (ref 0.5–1.3)
DIFF PNL FLD: ABNORMAL
EGFR: 57 ML/MIN/1.73M2 — LOW
EOSINOPHIL # BLD AUTO: 0.04 K/UL — SIGNIFICANT CHANGE UP (ref 0–0.5)
EOSINOPHIL NFR BLD AUTO: 0.5 % — SIGNIFICANT CHANGE UP (ref 0–6)
GLUCOSE SERPL-MCNC: 105 MG/DL — HIGH (ref 70–99)
GLUCOSE UR QL: NEGATIVE MG/DL — SIGNIFICANT CHANGE UP
HCT VFR BLD CALC: 35.9 % — SIGNIFICANT CHANGE UP (ref 34.5–45)
HGB BLD-MCNC: 11.9 G/DL — SIGNIFICANT CHANGE UP (ref 11.5–15.5)
IMM GRANULOCYTES NFR BLD AUTO: 0.5 % — SIGNIFICANT CHANGE UP (ref 0–0.9)
INR BLD: 0.93 RATIO — SIGNIFICANT CHANGE UP (ref 0.85–1.18)
KETONES UR-MCNC: NEGATIVE MG/DL — SIGNIFICANT CHANGE UP
LEUKOCYTE ESTERASE UR-ACNC: ABNORMAL
LYMPHOCYTES # BLD AUTO: 0.8 K/UL — LOW (ref 1–3.3)
LYMPHOCYTES # BLD AUTO: 10.2 % — LOW (ref 13–44)
MCHC RBC-ENTMCNC: 31.2 PG — SIGNIFICANT CHANGE UP (ref 27–34)
MCHC RBC-ENTMCNC: 33.1 GM/DL — SIGNIFICANT CHANGE UP (ref 32–36)
MCV RBC AUTO: 94 FL — SIGNIFICANT CHANGE UP (ref 80–100)
MONOCYTES # BLD AUTO: 0.69 K/UL — SIGNIFICANT CHANGE UP (ref 0–0.9)
MONOCYTES NFR BLD AUTO: 8.8 % — SIGNIFICANT CHANGE UP (ref 2–14)
NEUTROPHILS # BLD AUTO: 6.27 K/UL — SIGNIFICANT CHANGE UP (ref 1.8–7.4)
NEUTROPHILS NFR BLD AUTO: 79.7 % — HIGH (ref 43–77)
NITRITE UR-MCNC: NEGATIVE — SIGNIFICANT CHANGE UP
PH UR: 5.5 — SIGNIFICANT CHANGE UP (ref 5–8)
PLATELET # BLD AUTO: 212 K/UL — SIGNIFICANT CHANGE UP (ref 150–400)
POTASSIUM SERPL-MCNC: 4.2 MMOL/L — SIGNIFICANT CHANGE UP (ref 3.5–5.3)
POTASSIUM SERPL-SCNC: 4.2 MMOL/L — SIGNIFICANT CHANGE UP (ref 3.5–5.3)
PROT SERPL-MCNC: 7.2 GM/DL — SIGNIFICANT CHANGE UP (ref 6–8.3)
PROT UR-MCNC: 100 MG/DL
PROTHROM AB SERPL-ACNC: 10.5 SEC — SIGNIFICANT CHANGE UP (ref 9.5–13)
RBC # BLD: 3.82 M/UL — SIGNIFICANT CHANGE UP (ref 3.8–5.2)
RBC # FLD: 14.4 % — SIGNIFICANT CHANGE UP (ref 10.3–14.5)
RBC CASTS # UR COMP ASSIST: 2 /HPF — SIGNIFICANT CHANGE UP (ref 0–4)
SODIUM SERPL-SCNC: 140 MMOL/L — SIGNIFICANT CHANGE UP (ref 135–145)
SP GR SPEC: 1.02 — SIGNIFICANT CHANGE UP (ref 1–1.03)
SQUAMOUS # UR AUTO: 3 /HPF — SIGNIFICANT CHANGE UP (ref 0–5)
TROPONIN I, HIGH SENSITIVITY RESULT: 19.73 NG/L — SIGNIFICANT CHANGE UP
UROBILINOGEN FLD QL: 1 MG/DL — SIGNIFICANT CHANGE UP (ref 0.2–1)
WBC # BLD: 7.86 K/UL — SIGNIFICANT CHANGE UP (ref 3.8–10.5)
WBC # FLD AUTO: 7.86 K/UL — SIGNIFICANT CHANGE UP (ref 3.8–10.5)
WBC UR QL: 13 /HPF — HIGH (ref 0–5)

## 2024-07-14 PROCEDURE — 99285 EMERGENCY DEPT VISIT HI MDM: CPT

## 2024-07-14 PROCEDURE — 85610 PROTHROMBIN TIME: CPT

## 2024-07-14 PROCEDURE — 85730 THROMBOPLASTIN TIME PARTIAL: CPT

## 2024-07-14 PROCEDURE — 86901 BLOOD TYPING SEROLOGIC RH(D): CPT

## 2024-07-14 PROCEDURE — 84484 ASSAY OF TROPONIN QUANT: CPT

## 2024-07-14 PROCEDURE — 93010 ELECTROCARDIOGRAM REPORT: CPT

## 2024-07-14 PROCEDURE — 72125 CT NECK SPINE W/O DYE: CPT | Mod: MC

## 2024-07-14 PROCEDURE — 81001 URINALYSIS AUTO W/SCOPE: CPT

## 2024-07-14 PROCEDURE — 87086 URINE CULTURE/COLONY COUNT: CPT

## 2024-07-14 PROCEDURE — 86850 RBC ANTIBODY SCREEN: CPT

## 2024-07-14 PROCEDURE — 70450 CT HEAD/BRAIN W/O DYE: CPT | Mod: MC

## 2024-07-14 PROCEDURE — 99285 EMERGENCY DEPT VISIT HI MDM: CPT | Mod: 25

## 2024-07-14 PROCEDURE — 36415 COLL VENOUS BLD VENIPUNCTURE: CPT

## 2024-07-14 PROCEDURE — 86900 BLOOD TYPING SEROLOGIC ABO: CPT

## 2024-07-14 PROCEDURE — 72125 CT NECK SPINE W/O DYE: CPT | Mod: 26,MC

## 2024-07-14 PROCEDURE — 85025 COMPLETE CBC W/AUTO DIFF WBC: CPT

## 2024-07-14 PROCEDURE — 80053 COMPREHEN METABOLIC PANEL: CPT

## 2024-07-14 PROCEDURE — 70450 CT HEAD/BRAIN W/O DYE: CPT | Mod: 26,MC

## 2024-07-14 PROCEDURE — 93005 ELECTROCARDIOGRAM TRACING: CPT

## 2024-07-14 RX ORDER — SODIUM CHLORIDE 0.9 % (FLUSH) 0.9 %
3 SYRINGE (ML) INJECTION ONCE
Refills: 0 | Status: COMPLETED | OUTPATIENT
Start: 2024-07-14 | End: 2024-07-14

## 2024-07-14 RX ADMIN — Medication 3 MILLILITER(S): at 19:53

## 2024-07-15 VITALS
TEMPERATURE: 98 F | RESPIRATION RATE: 17 BRPM | OXYGEN SATURATION: 92 % | DIASTOLIC BLOOD PRESSURE: 71 MMHG | HEART RATE: 65 BPM | SYSTOLIC BLOOD PRESSURE: 150 MMHG

## 2024-07-15 LAB — TROPONIN I, HIGH SENSITIVITY RESULT: 29.05 NG/L — SIGNIFICANT CHANGE UP

## 2024-07-16 LAB
CULTURE RESULTS: SIGNIFICANT CHANGE UP
SPECIMEN SOURCE: SIGNIFICANT CHANGE UP

## 2025-01-28 ENCOUNTER — EMERGENCY (EMERGENCY)
Facility: HOSPITAL | Age: 89
LOS: 0 days | Discharge: HOME | End: 2025-01-28
Attending: STUDENT IN AN ORGANIZED HEALTH CARE EDUCATION/TRAINING PROGRAM
Payer: MEDICARE

## 2025-01-28 VITALS
WEIGHT: 80.03 LBS | HEART RATE: 63 BPM | TEMPERATURE: 98 F | RESPIRATION RATE: 17 BRPM | SYSTOLIC BLOOD PRESSURE: 158 MMHG | DIASTOLIC BLOOD PRESSURE: 72 MMHG | OXYGEN SATURATION: 91 %

## 2025-01-28 VITALS
RESPIRATION RATE: 17 BRPM | HEART RATE: 105 BPM | DIASTOLIC BLOOD PRESSURE: 50 MMHG | OXYGEN SATURATION: 99 % | SYSTOLIC BLOOD PRESSURE: 164 MMHG | TEMPERATURE: 98 F

## 2025-01-28 DIAGNOSIS — Z98.890 OTHER SPECIFIED POSTPROCEDURAL STATES: Chronic | ICD-10-CM

## 2025-01-28 DIAGNOSIS — S51.811A LACERATION WITHOUT FOREIGN BODY OF RIGHT FOREARM, INITIAL ENCOUNTER: ICD-10-CM

## 2025-01-28 DIAGNOSIS — Z90.49 ACQUIRED ABSENCE OF OTHER SPECIFIED PARTS OF DIGESTIVE TRACT: Chronic | ICD-10-CM

## 2025-01-28 DIAGNOSIS — Z90.710 ACQUIRED ABSENCE OF BOTH CERVIX AND UTERUS: Chronic | ICD-10-CM

## 2025-01-28 DIAGNOSIS — S51.011A LACERATION WITHOUT FOREIGN BODY OF RIGHT ELBOW, INITIAL ENCOUNTER: ICD-10-CM
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
## 2025-01-28 DIAGNOSIS — S61.511A LACERATION WITHOUT FOREIGN BODY OF RIGHT WRIST, INITIAL ENCOUNTER: ICD-10-CM

## 2025-01-28 LAB
ALBUMIN SERPL ELPH-MCNC: 3.3 G/DL — SIGNIFICANT CHANGE UP (ref 3.3–5)
ALP SERPL-CCNC: 57 U/L — SIGNIFICANT CHANGE UP (ref 40–120)
ALT FLD-CCNC: 11 U/L — LOW (ref 12–78)
ANION GAP SERPL CALC-SCNC: 3 MMOL/L — LOW (ref 5–17)
AST SERPL-CCNC: 18 U/L — SIGNIFICANT CHANGE UP (ref 15–37)
BASOPHILS # BLD AUTO: 0.03 K/UL — SIGNIFICANT CHANGE UP (ref 0–0.2)
BASOPHILS NFR BLD AUTO: 0.5 % — SIGNIFICANT CHANGE UP (ref 0–2)
BILIRUB SERPL-MCNC: 0.4 MG/DL — SIGNIFICANT CHANGE UP (ref 0.2–1.2)
BUN SERPL-MCNC: 30 MG/DL — HIGH (ref 7–23)
CALCIUM SERPL-MCNC: 9 MG/DL — SIGNIFICANT CHANGE UP (ref 8.5–10.1)
CHLORIDE SERPL-SCNC: 110 MMOL/L — HIGH (ref 96–108)
CO2 SERPL-SCNC: 28 MMOL/L — SIGNIFICANT CHANGE UP (ref 22–31)
CREAT SERPL-MCNC: 0.96 MG/DL — SIGNIFICANT CHANGE UP (ref 0.5–1.3)
EGFR: 52 ML/MIN/1.73M2 — LOW
EOSINOPHIL # BLD AUTO: 0.18 K/UL — SIGNIFICANT CHANGE UP (ref 0–0.5)
EOSINOPHIL NFR BLD AUTO: 3 % — SIGNIFICANT CHANGE UP (ref 0–6)
GLUCOSE SERPL-MCNC: 100 MG/DL — HIGH (ref 70–99)
HCT VFR BLD CALC: 36.3 % — SIGNIFICANT CHANGE UP (ref 34.5–45)
HGB BLD-MCNC: 11.7 G/DL — SIGNIFICANT CHANGE UP (ref 11.5–15.5)
IMM GRANULOCYTES NFR BLD AUTO: 0.5 % — SIGNIFICANT CHANGE UP (ref 0–0.9)
LYMPHOCYTES # BLD AUTO: 1.02 K/UL — SIGNIFICANT CHANGE UP (ref 1–3.3)
LYMPHOCYTES # BLD AUTO: 16.8 % — SIGNIFICANT CHANGE UP (ref 13–44)
MCHC RBC-ENTMCNC: 30.9 PG — SIGNIFICANT CHANGE UP (ref 27–34)
MCHC RBC-ENTMCNC: 32.2 G/DL — SIGNIFICANT CHANGE UP (ref 32–36)
MCV RBC AUTO: 95.8 FL — SIGNIFICANT CHANGE UP (ref 80–100)
MONOCYTES # BLD AUTO: 0.71 K/UL — SIGNIFICANT CHANGE UP (ref 0–0.9)
MONOCYTES NFR BLD AUTO: 11.7 % — SIGNIFICANT CHANGE UP (ref 2–14)
NEUTROPHILS # BLD AUTO: 4.09 K/UL — SIGNIFICANT CHANGE UP (ref 1.8–7.4)
NEUTROPHILS NFR BLD AUTO: 67.5 % — SIGNIFICANT CHANGE UP (ref 43–77)
PLATELET # BLD AUTO: 224 K/UL — SIGNIFICANT CHANGE UP (ref 150–400)
POTASSIUM SERPL-MCNC: 4.3 MMOL/L — SIGNIFICANT CHANGE UP (ref 3.5–5.3)
POTASSIUM SERPL-SCNC: 4.3 MMOL/L — SIGNIFICANT CHANGE UP (ref 3.5–5.3)
PROT SERPL-MCNC: 6.8 GM/DL — SIGNIFICANT CHANGE UP (ref 6–8.3)
RBC # BLD: 3.79 M/UL — LOW (ref 3.8–5.2)
RBC # FLD: 13.9 % — SIGNIFICANT CHANGE UP (ref 10.3–14.5)
SODIUM SERPL-SCNC: 141 MMOL/L — SIGNIFICANT CHANGE UP (ref 135–145)
TROPONIN I, HIGH SENSITIVITY RESULT: 9.82 NG/L — SIGNIFICANT CHANGE UP
WBC # BLD: 6.06 K/UL — SIGNIFICANT CHANGE UP (ref 3.8–10.5)
WBC # FLD AUTO: 6.06 K/UL — SIGNIFICANT CHANGE UP (ref 3.8–10.5)

## 2025-01-28 PROCEDURE — 93010 ELECTROCARDIOGRAM REPORT: CPT

## 2025-01-28 PROCEDURE — 99285 EMERGENCY DEPT VISIT HI MDM: CPT

## 2025-01-28 PROCEDURE — 80053 COMPREHEN METABOLIC PANEL: CPT

## 2025-01-28 PROCEDURE — 70450 CT HEAD/BRAIN W/O DYE: CPT | Mod: 26

## 2025-01-28 PROCEDURE — 71045 X-RAY EXAM CHEST 1 VIEW: CPT | Mod: 26

## 2025-01-28 PROCEDURE — 85025 COMPLETE CBC W/AUTO DIFF WBC: CPT

## 2025-01-28 PROCEDURE — 84484 ASSAY OF TROPONIN QUANT: CPT

## 2025-01-28 PROCEDURE — 71045 X-RAY EXAM CHEST 1 VIEW: CPT

## 2025-01-28 PROCEDURE — 70450 CT HEAD/BRAIN W/O DYE: CPT | Mod: MC

## 2025-01-28 PROCEDURE — 36415 COLL VENOUS BLD VENIPUNCTURE: CPT

## 2025-01-28 PROCEDURE — 93005 ELECTROCARDIOGRAM TRACING: CPT

## 2025-01-28 PROCEDURE — 99285 EMERGENCY DEPT VISIT HI MDM: CPT | Mod: 25

## 2025-01-28 RX ORDER — LOSARTAN POTASSIUM 100 MG/1
50 TABLET, FILM COATED ORAL ONCE
Refills: 0 | Status: COMPLETED | OUTPATIENT
Start: 2025-01-28 | End: 2025-01-28

## 2025-01-28 RX ADMIN — LOSARTAN POTASSIUM 50 MILLIGRAM(S): 100 TABLET, FILM COATED ORAL at 06:23

## 2025-01-28 NOTE — ED PROVIDER NOTE - NSFOLLOWUPINSTRUCTIONS_ED_ALL_ED_FT
1) Please follow-up with your primary care doctor in the next 5-7 days.  Please call tomorrow for an appointment.  If you cannot follow-up with your primary care doctor please return to the ED for any urgent issues.  2) You were given a copy of the tests performed today.  Please bring the results with you and review them with your primary care doctor.  3) If you have any worsening of symptoms or any other concerns please return to the ED immediately.  4) Please continue taking your home medications as directed.    Laceration    A laceration is a cut that goes through all of the layers of the skin and into the tissue that is right under the skin. Some lacerations heal on their own. Others need to be closed with skin adhesive strips, skin glue, stitches (sutures), or staples. Proper laceration care minimizes the risk of infection and helps the laceration to heal better.  If non-absorbable stitches or staples have been placed, they must be taken out within the time frame instructed by your healthcare provider.    SEEK IMMEDIATE MEDICAL CARE IF YOU HAVE ANY OF THE FOLLOWING SYMPTOMS: swelling around the wound, worsening pain, drainage from the wound, red streaking going away from your wound, inability to move finger or toe near the laceration, or discoloration of skin near the laceration.

## 2025-01-28 NOTE — ED ADULT NURSE NOTE - CHIEF COMPLAINT QUOTE
Pt BIBEMS from [facility] with c/o unwitnessed fall <1 hr PTA. Per EMS, pt was using bathroom when he fell. Unknown HS, unknown LOC, -use of AC. Skin tears noted to right shin, right wrist, right elbow with bleeding controlled with guaz. Redness noted to right lower eyelid, no swelling or hematoma noted No other obvious trauma noted. Pt endorsing no other complaints. PTA pt, placed on 3l NC due to O2 sat drop. Pt 91% RA upon arrival, no respiratory distress noted, pt breathing unlabored, spontaneously, respirations even. MD Souza called to bedside for eval, no code called. A&Ox3, NKDA, GCS 15, +PERRLA. Placed in view of nurses station.

## 2025-01-28 NOTE — ED PROVIDER NOTE - PHYSICAL EXAMINATION
no
Const: Well appearing, NAD  Eyes: PERRL, EOM intact  CV: RRR, no murmurs, no chest wall tenderness, distal pulses intact  Resp: CTAB, normal resp effort  GI: soft, nondistended, nontender. No CVA tenderness  MSK: Full ROM, no muscle or bony deformity or tenderness  Neuro: AOx3, GCS 15, No focal deficits  Skin: Skin tear on posterior right elbow. Lateral right forearm, dorsal right wrist.   Psych: calm, cooperative.

## 2025-01-28 NOTE — ED PROVIDER NOTE - PATIENT PORTAL LINK FT
You can access the FollowMyHealth Patient Portal offered by [facility] by registering at the following website: [email or web address]. By joining [facility]’s FollowMyHealth portal, you will also be able to view your health information using other applications (apps) compatible with our system.

## 2025-01-28 NOTE — ED ADULT NURSE NOTE - OBJECTIVE STATEMENT
Pt 89y female, A&Ox3, breathing unlabored, presents via EMS from [facility] with s/p unwitnessed fall in bathroom, Unknown HS, unknown LOC, -use of AC. Pt noted to have 3 skin tears on R forearm. no other injuries or complaints at this time, Pt daughter at bedside.

## 2025-01-28 NOTE — ED PROVIDER NOTE - CLINICAL SUMMARY MEDICAL DECISION MAKING FREE TEXT BOX
Patient presents to the ED after an unwitnessed fall.  Patient is alert and oriented x 3.  No history of dementia.  Unsure of exactly how she fell but states she did not hit her head or lose consciousness.  Has no complaints.  CT head with no acute findings.  Patient with multiple skin tears on the right arm unabel to close.  Cleaned and dressed by RN.  No other signs of trauma on exam.  Labs nonactionable. Ambulating in ER with assistance which is baseline i. Tolerating PO. Stable for dc. Patient verbalizes understanding of return precautions and f/u.

## 2025-01-28 NOTE — ED ADULT NURSE NOTE - NS PRO PASSIVE SMOKE EXP
Clinic Care Coordination Contact  [facility]/Voicemail       Clinical Data: Care Coordinator TCM Outreach  Outreach attempted x 2.  Unable to leave voicemail. Voicemail full.   Plan:  Care Coordinator will try to reach patient again in 3-5 business days.          
No

## 2025-01-28 NOTE — ED ADULT TRIAGE NOTE - CHIEF COMPLAINT QUOTE
Pt BIBEMS from [facility] with c/o unwitnessed fall <1 hr PTA. Per EMS, pt was using bathroom when he fell. Unknown HS, unknown LOC, -use of AC. Skin tears noted to right shin, right wrist, right elbow with bleeding controlled with guaz. Redness noted to right lower eyelid, no swelling or hematoma noted No other obvious trauma noted. Pt endorsing no other complaints. PTA pt, placed on 3l NC due to O2 sat drop. Pt 91% RA upon arrival, no respiratory distress noted, pt breathing unlabored, spontaneously, respirations even. MD Souza called to bedside for eval, no code called. A&Ox3, NKDA, GCS 15, +PERRLA. Placed in view of nurses station.

## 2025-04-08 ENCOUNTER — EMERGENCY (EMERGENCY)
Facility: HOSPITAL | Age: 89
LOS: 1 days | End: 2025-04-08
Attending: STUDENT IN AN ORGANIZED HEALTH CARE EDUCATION/TRAINING PROGRAM
Payer: MEDICARE

## 2025-04-08 VITALS
OXYGEN SATURATION: 97 % | DIASTOLIC BLOOD PRESSURE: 62 MMHG | SYSTOLIC BLOOD PRESSURE: 162 MMHG | RESPIRATION RATE: 18 BRPM | HEART RATE: 66 BPM | TEMPERATURE: 98 F

## 2025-04-08 VITALS
DIASTOLIC BLOOD PRESSURE: 60 MMHG | HEART RATE: 70 BPM | OXYGEN SATURATION: 97 % | TEMPERATURE: 98 F | RESPIRATION RATE: 18 BRPM | SYSTOLIC BLOOD PRESSURE: 183 MMHG

## 2025-04-08 DIAGNOSIS — Z90.710 ACQUIRED ABSENCE OF BOTH CERVIX AND UTERUS: Chronic | ICD-10-CM

## 2025-04-08 DIAGNOSIS — Z98.890 OTHER SPECIFIED POSTPROCEDURAL STATES: Chronic | ICD-10-CM

## 2025-04-08 DIAGNOSIS — Z90.49 ACQUIRED ABSENCE OF OTHER SPECIFIED PARTS OF DIGESTIVE TRACT: Chronic | ICD-10-CM

## 2025-04-08 LAB
ALBUMIN SERPL ELPH-MCNC: 3.5 G/DL — SIGNIFICANT CHANGE UP (ref 3.3–5)
ALP SERPL-CCNC: 95 U/L — SIGNIFICANT CHANGE UP (ref 40–120)
ALT FLD-CCNC: 12 U/L — SIGNIFICANT CHANGE UP (ref 12–78)
ANION GAP SERPL CALC-SCNC: 5 MMOL/L — SIGNIFICANT CHANGE UP (ref 5–17)
AST SERPL-CCNC: 17 U/L — SIGNIFICANT CHANGE UP (ref 15–37)
BASOPHILS # BLD AUTO: 0.02 K/UL — SIGNIFICANT CHANGE UP (ref 0–0.2)
BASOPHILS NFR BLD AUTO: 0.3 % — SIGNIFICANT CHANGE UP (ref 0–2)
BILIRUB SERPL-MCNC: 0.3 MG/DL — SIGNIFICANT CHANGE UP (ref 0.2–1.2)
BUN SERPL-MCNC: 38 MG/DL — HIGH (ref 7–23)
CALCIUM SERPL-MCNC: 9 MG/DL — SIGNIFICANT CHANGE UP (ref 8.5–10.1)
CHLORIDE SERPL-SCNC: 109 MMOL/L — HIGH (ref 96–108)
CO2 SERPL-SCNC: 27 MMOL/L — SIGNIFICANT CHANGE UP (ref 22–31)
CREAT SERPL-MCNC: 1.27 MG/DL — SIGNIFICANT CHANGE UP (ref 0.5–1.3)
EGFR: 38 ML/MIN/1.73M2 — LOW
EGFR: 38 ML/MIN/1.73M2 — LOW
EOSINOPHIL # BLD AUTO: 0.12 K/UL — SIGNIFICANT CHANGE UP (ref 0–0.5)
EOSINOPHIL NFR BLD AUTO: 1.8 % — SIGNIFICANT CHANGE UP (ref 0–6)
GLUCOSE SERPL-MCNC: 112 MG/DL — HIGH (ref 70–99)
HCT VFR BLD CALC: 39.9 % — SIGNIFICANT CHANGE UP (ref 34.5–45)
HGB BLD-MCNC: 12.9 G/DL — SIGNIFICANT CHANGE UP (ref 11.5–15.5)
IMM GRANULOCYTES # BLD AUTO: 0.02 K/UL — SIGNIFICANT CHANGE UP (ref 0–0.07)
IMM GRANULOCYTES NFR BLD AUTO: 0.3 % — SIGNIFICANT CHANGE UP (ref 0–0.9)
LYMPHOCYTES # BLD AUTO: 1.27 K/UL — SIGNIFICANT CHANGE UP (ref 1–3.3)
LYMPHOCYTES NFR BLD AUTO: 19.4 % — SIGNIFICANT CHANGE UP (ref 13–44)
MCHC RBC-ENTMCNC: 31.4 PG — SIGNIFICANT CHANGE UP (ref 27–34)
MCHC RBC-ENTMCNC: 32.3 G/DL — SIGNIFICANT CHANGE UP (ref 32–36)
MCV RBC AUTO: 97.1 FL — SIGNIFICANT CHANGE UP (ref 80–100)
MONOCYTES # BLD AUTO: 0.72 K/UL — SIGNIFICANT CHANGE UP (ref 0–0.9)
MONOCYTES NFR BLD AUTO: 11 % — SIGNIFICANT CHANGE UP (ref 2–14)
NEUTROPHILS # BLD AUTO: 4.38 K/UL — SIGNIFICANT CHANGE UP (ref 1.8–7.4)
NEUTROPHILS NFR BLD AUTO: 67.2 % — SIGNIFICANT CHANGE UP (ref 43–77)
NRBC # BLD AUTO: 0 K/UL — SIGNIFICANT CHANGE UP (ref 0–0)
NRBC # FLD: 0 K/UL — SIGNIFICANT CHANGE UP (ref 0–0)
NRBC BLD AUTO-RTO: 0 /100 WBCS — SIGNIFICANT CHANGE UP (ref 0–0)
NT-PROBNP SERPL-SCNC: 300 PG/ML — SIGNIFICANT CHANGE UP (ref 0–450)
PLATELET # BLD AUTO: 209 K/UL — SIGNIFICANT CHANGE UP (ref 150–400)
PMV BLD: 9.6 FL — SIGNIFICANT CHANGE UP (ref 7–13)
POTASSIUM SERPL-MCNC: 4.8 MMOL/L — SIGNIFICANT CHANGE UP (ref 3.5–5.3)
POTASSIUM SERPL-SCNC: 4.8 MMOL/L — SIGNIFICANT CHANGE UP (ref 3.5–5.3)
PROT SERPL-MCNC: 7.3 GM/DL — SIGNIFICANT CHANGE UP (ref 6–8.3)
RBC # BLD: 4.11 M/UL — SIGNIFICANT CHANGE UP (ref 3.8–5.2)
RBC # FLD: 13.2 % — SIGNIFICANT CHANGE UP (ref 10.3–14.5)
SODIUM SERPL-SCNC: 141 MMOL/L — SIGNIFICANT CHANGE UP (ref 135–145)
TROPONIN I, HIGH SENSITIVITY RESULT: 12.59 NG/L — SIGNIFICANT CHANGE UP
WBC # BLD: 6.53 K/UL — SIGNIFICANT CHANGE UP (ref 3.8–10.5)
WBC # FLD AUTO: 6.53 K/UL — SIGNIFICANT CHANGE UP (ref 3.8–10.5)

## 2025-04-08 PROCEDURE — 70450 CT HEAD/BRAIN W/O DYE: CPT | Mod: 26

## 2025-04-08 PROCEDURE — 93010 ELECTROCARDIOGRAM REPORT: CPT

## 2025-04-08 PROCEDURE — 36415 COLL VENOUS BLD VENIPUNCTURE: CPT

## 2025-04-08 PROCEDURE — 99285 EMERGENCY DEPT VISIT HI MDM: CPT | Mod: 25

## 2025-04-08 PROCEDURE — 84484 ASSAY OF TROPONIN QUANT: CPT

## 2025-04-08 PROCEDURE — 99285 EMERGENCY DEPT VISIT HI MDM: CPT

## 2025-04-08 PROCEDURE — 80053 COMPREHEN METABOLIC PANEL: CPT

## 2025-04-08 PROCEDURE — 85025 COMPLETE CBC W/AUTO DIFF WBC: CPT

## 2025-04-08 PROCEDURE — 71045 X-RAY EXAM CHEST 1 VIEW: CPT | Mod: 26

## 2025-04-08 PROCEDURE — 71045 X-RAY EXAM CHEST 1 VIEW: CPT

## 2025-04-08 PROCEDURE — 72125 CT NECK SPINE W/O DYE: CPT | Mod: MC

## 2025-04-08 PROCEDURE — 70450 CT HEAD/BRAIN W/O DYE: CPT | Mod: MC

## 2025-04-08 PROCEDURE — 72125 CT NECK SPINE W/O DYE: CPT | Mod: 26

## 2025-04-08 PROCEDURE — 93005 ELECTROCARDIOGRAM TRACING: CPT

## 2025-04-08 PROCEDURE — 83880 ASSAY OF NATRIURETIC PEPTIDE: CPT

## 2025-04-08 NOTE — ED PROVIDER NOTE - NSFOLLOWUPINSTRUCTIONS_ED_ALL_ED_FT
1) Please follow-up with your primary care doctor in the next 5-7 days.  Please call tomorrow for an appointment.  If you cannot follow-up with your primary care doctor please return to the ED for any urgent issues.  2) You were given a copy of the tests performed today.  Please bring the results with you and review them with your primary care doctor.  3) If you have any worsening of symptoms or any other concerns please return to the ED immediately.  4) Please continue taking your home medications as directed.    Closed Head Injury    A closed head injury is an injury to your head that may or may not involve a traumatic brain injury (TBI). Symptoms of TBI can be short or long lasting and include headache, dizziness, interference with memory or speech, fatigue, confusion, changes in sleep, mood changes, nausea, depression/anxiety, and dulling of senses. Make sure to obtain proper rest which includes getting plenty of sleep, avoiding excessive visual stimulation, and avoiding activities that may cause physical or mental stress. Avoid any situation where there is potential for another head injury, including sports.    SEEK IMMEDIATE MEDICAL CARE IF YOU HAVE ANY OF THE FOLLOWING SYMPTOMS: unusual drowsiness, vomiting, severe dizziness, seizures, lightheadedness, muscular weakness, different pupil sizes, visual changes, or clear or bloody discharge from your ears or nose.

## 2025-04-08 NOTE — ED PROVIDER NOTE - CLINICAL SUMMARY MEDICAL DECISION MAKING FREE TEXT BOX
Patient with unwitnessed fall, head injury. neuro Alert called. Negative CT scans. Labs nonactionable. Abrasion cleaned and covered with gauze. Able to stand and bear weight at her baseline level. Tolerating PO. Stable for dc. Patient verbalizes understanding of return precautions and f/u.

## 2025-04-08 NOTE — ED ADULT TRIAGE NOTE - CHIEF COMPLAINT QUOTE
pt bibems from [facility] for unwitnessed fall in the bathroom. per EMS, patient was on the ground for an extended period of time. (+) headstrike. (-) bloodthinners, (-) LOC. laceration to back right side of head, bleeding resolved on arrival. denies pain or dizziness. NA called at 0052, brought to CT from triage.

## 2025-04-08 NOTE — ED ADULT NURSE NOTE - CHIEF COMPLAINT QUOTE
pt bibems from [facility] for unwitnessed fall in the bathroom. per EMS, patient was on the ground for an extended period of time. (+) headstrike. (-) bloodthinners, (-) LOC. laceration to back right side of head, bleeding resolved on arrival. denies pain or dizziness. NA called at 0052, brought to CT from triage.

## 2025-04-08 NOTE — ED ADULT NURSE NOTE - NSFALLHARMRISKINTERV_ED_ALL_ED

## 2025-04-08 NOTE — ED PROVIDER NOTE - PRO INTERPRETER NEED 2
85m CAD c/b MI (remote past) PCI with prior stents patent with small vessel disease (incompletely revascularized),  COPD, Depression, GERD, Hyperlipidemia  Hypertension, Osteoporosis, Peripheral Neuropathy, Dementia.   Recently admitted for RLL PNA from 1/17-1/22 with increasing confusion was treated with a course of abx and the confusion was thought to be related to metabolic encephalopathy.  Repeat admission for weakness and hypotension from 2/14-2/22 with GIB, was [facility] but transferred to [facility] stabilized after transfusions then d/c home. He is now admitted with SOB, cough, and one episode of vomiting.  He was found hypoxic.  His subsequent course has been notable for Ecoli bacteremia, likely urinary, and persistent confusion.  Brief wide complex tachycardia.  Consultation is now being obtained.    CAD  - No acute ischemia  - Has known cad, with some un revascularized small vessel disease, in addition to prior PCI and CABG  - he has a hx of GIB, unclear if this is reason enough to avoid the use of asa at this time  - We saw him in January and he was on asa at that time. Would resume unless GI suggests risk > benefit  - Continue statin  - No clear volume overload     brief wct  - Unclear if this was VT, as RBBB orientation did not change  - Continue metoprolol succ 25 daily if not contraindicated, has been on it in the past  - Overall prognosis of VT is based on ef, which was preserved based on last echo within the year. No need to repeat  - Monitor electrolytes, maintain K >4 Mag >2.  Replete prn.     CKD  - Renal following  - Avoid nephrotoxic agents.  - Hyponatremia resolved.     HTN  - BP: 160/84 (06-09-20 @ 04:43) (151/96 - 160/84)  - Continue BB off CCB  - Orthostatic BP negative   - If continues to be elevated would restart CCB with hold parameters.      - Monitor and replete lytes, keep K>4, Mg>2.  - All other medical needs as per primary team.  - Other cardiovascular workup will depend on clinical course.  - Will continue to follow.      Gaye Pringle, MS FNP, [facility]  Nurse Practitioner- Cardiology   Spectra #[phone] English

## 2025-04-08 NOTE — ED PROVIDER NOTE - PHYSICAL EXAMINATION
Const: Well appearing, NAD  Eyes: PERRL, EOM intact  CV: RRR, no murmurs, no chest wall tenderness, distal pulses intact  Resp: CTAB, normal resp effort  GI: soft, nondistended, nontender. No CVA tenderness  MSK: Full ROM, no muscle or bony deformity or tenderness  Neuro: AOx3, GCS 15, No focal deficits  Skin: Abrasion right paritetla scalp. No rash, laceration   Psych: calm, cooperative.

## 2025-04-08 NOTE — ED ADULT NURSE NOTE - OBJECTIVE STATEMENT
89yF AOx4 ambulatory w/ walker at baseline from [facility] to  ED s/p unwitnessed fall. + head strike, unknown LOC, - AC use. laceration to R side of head, no active bleeding at this time. pt denies CP, SOB, dizziness, pain. PMH HTN. IV inserted, labs drawn and sent, pt placed on cardiac monitor, ekg completed. daughter at bedside.

## 2025-04-08 NOTE — ED PROVIDER NOTE - PATIENT PORTAL LINK FT
You can access the FollowMyHealth Patient Portal offered by [facility] by registering at the following website: [email or web address]. By joining [employer]’s FollowMyHealth portal, you will also be able to view your health information using other applications (apps) compatible with our system.

## 2025-04-08 NOTE — ED PROVIDER NOTE - WR ORDER ID 1
[id number] decreased stride length/decreased belinda/decreased weight-shifting ability/decreased step length

## 2025-07-10 ENCOUNTER — EMERGENCY (EMERGENCY)
Facility: HOSPITAL | Age: 89
LOS: 0 days | Discharge: NURSING, REHAB OR LONG-TERM CARE | End: 2025-07-11
Attending: EMERGENCY MEDICINE
Payer: MEDICARE

## 2025-07-10 VITALS — HEIGHT: 59 IN | WEIGHT: 149.91 LBS

## 2025-07-10 DIAGNOSIS — S00.03XA CONTUSION OF SCALP, INITIAL ENCOUNTER: ICD-10-CM

## 2025-07-10 DIAGNOSIS — I10 ESSENTIAL (PRIMARY) HYPERTENSION: ICD-10-CM
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
## 2025-07-10 DIAGNOSIS — Z90.49 ACQUIRED ABSENCE OF OTHER SPECIFIED PARTS OF DIGESTIVE TRACT: Chronic | ICD-10-CM

## 2025-07-10 DIAGNOSIS — Z90.710 ACQUIRED ABSENCE OF BOTH CERVIX AND UTERUS: Chronic | ICD-10-CM

## 2025-07-10 DIAGNOSIS — Z98.890 OTHER SPECIFIED POSTPROCEDURAL STATES: Chronic | ICD-10-CM

## 2025-07-10 DIAGNOSIS — N39.0 URINARY TRACT INFECTION, SITE NOT SPECIFIED: ICD-10-CM
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
## 2025-07-10 LAB
ALBUMIN SERPL ELPH-MCNC: 3.7 G/DL — SIGNIFICANT CHANGE UP (ref 3.3–5)
ALP SERPL-CCNC: 68 U/L — SIGNIFICANT CHANGE UP (ref 40–120)
ALT FLD-CCNC: 16 U/L — SIGNIFICANT CHANGE UP (ref 12–78)
ANION GAP SERPL CALC-SCNC: 4 MMOL/L — LOW (ref 5–17)
APPEARANCE UR: CLEAR — SIGNIFICANT CHANGE UP
APTT BLD: 23.1 SEC — LOW (ref 26.1–36.8)
AST SERPL-CCNC: 16 U/L — SIGNIFICANT CHANGE UP (ref 15–37)
BACTERIA # UR AUTO: ABNORMAL /HPF
BASOPHILS # BLD AUTO: 0.03 K/UL — SIGNIFICANT CHANGE UP (ref 0–0.2)
BASOPHILS NFR BLD AUTO: 0.4 % — SIGNIFICANT CHANGE UP (ref 0–2)
BILIRUB SERPL-MCNC: 0.4 MG/DL — SIGNIFICANT CHANGE UP (ref 0.2–1.2)
BILIRUB UR-MCNC: NEGATIVE — SIGNIFICANT CHANGE UP
BUN SERPL-MCNC: 28 MG/DL — HIGH (ref 7–23)
CALCIUM SERPL-MCNC: 9.5 MG/DL — SIGNIFICANT CHANGE UP (ref 8.5–10.1)
CAST: 0 /LPF — SIGNIFICANT CHANGE UP (ref 0–4)
CHLORIDE SERPL-SCNC: 105 MMOL/L — SIGNIFICANT CHANGE UP (ref 96–108)
CO2 SERPL-SCNC: 27 MMOL/L — SIGNIFICANT CHANGE UP (ref 22–31)
COLOR SPEC: YELLOW — SIGNIFICANT CHANGE UP
CREAT SERPL-MCNC: 0.98 MG/DL — SIGNIFICANT CHANGE UP (ref 0.5–1.3)
DIFF PNL FLD: NEGATIVE — SIGNIFICANT CHANGE UP
EGFR: 51 ML/MIN/1.73M2 — LOW
EGFR: 51 ML/MIN/1.73M2 — LOW
EOSINOPHIL # BLD AUTO: 0.12 K/UL — SIGNIFICANT CHANGE UP (ref 0–0.5)
EOSINOPHIL NFR BLD AUTO: 1.4 % — SIGNIFICANT CHANGE UP (ref 0–6)
ETHANOL SERPL-MCNC: <10 MG/DL — SIGNIFICANT CHANGE UP (ref 0–10)
GLUCOSE SERPL-MCNC: 103 MG/DL — HIGH (ref 70–99)
GLUCOSE UR QL: NEGATIVE MG/DL — SIGNIFICANT CHANGE UP
HCT VFR BLD CALC: 38.4 % — SIGNIFICANT CHANGE UP (ref 34.5–45)
HGB BLD-MCNC: 12.4 G/DL — SIGNIFICANT CHANGE UP (ref 11.5–15.5)
IMM GRANULOCYTES # BLD AUTO: 0.03 K/UL — SIGNIFICANT CHANGE UP (ref 0–0.07)
IMM GRANULOCYTES NFR BLD AUTO: 0.4 % — SIGNIFICANT CHANGE UP (ref 0–0.9)
INR BLD: 0.86 RATIO — SIGNIFICANT CHANGE UP (ref 0.85–1.16)
KETONES UR QL: NEGATIVE MG/DL — SIGNIFICANT CHANGE UP
LACTATE SERPL-SCNC: 1.3 MMOL/L — SIGNIFICANT CHANGE UP (ref 0.7–2)
LEUKOCYTE ESTERASE UR-ACNC: ABNORMAL
LIDOCAIN IGE QN: 36 U/L — SIGNIFICANT CHANGE UP (ref 13–75)
LYMPHOCYTES # BLD AUTO: 1.71 K/UL — SIGNIFICANT CHANGE UP (ref 1–3.3)
LYMPHOCYTES NFR BLD AUTO: 20.6 % — SIGNIFICANT CHANGE UP (ref 13–44)
MCHC RBC-ENTMCNC: 30.8 PG — SIGNIFICANT CHANGE UP (ref 27–34)
MCHC RBC-ENTMCNC: 32.3 G/DL — SIGNIFICANT CHANGE UP (ref 32–36)
MCV RBC AUTO: 95.5 FL — SIGNIFICANT CHANGE UP (ref 80–100)
MONOCYTES # BLD AUTO: 0.79 K/UL — SIGNIFICANT CHANGE UP (ref 0–0.9)
MONOCYTES NFR BLD AUTO: 9.5 % — SIGNIFICANT CHANGE UP (ref 2–14)
NEUTROPHILS # BLD AUTO: 5.61 K/UL — SIGNIFICANT CHANGE UP (ref 1.8–7.4)
NEUTROPHILS NFR BLD AUTO: 67.7 % — SIGNIFICANT CHANGE UP (ref 43–77)
NITRITE UR-MCNC: POSITIVE
NRBC # BLD AUTO: 0 K/UL — SIGNIFICANT CHANGE UP (ref 0–0)
NRBC # FLD: 0 K/UL — SIGNIFICANT CHANGE UP (ref 0–0)
NRBC BLD AUTO-RTO: 0 /100 WBCS — SIGNIFICANT CHANGE UP (ref 0–0)
PH UR: 7.5 — SIGNIFICANT CHANGE UP (ref 5–8)
PLATELET # BLD AUTO: 251 K/UL — SIGNIFICANT CHANGE UP (ref 150–400)
PMV BLD: 9.7 FL — SIGNIFICANT CHANGE UP (ref 7–13)
POTASSIUM SERPL-MCNC: 4.3 MMOL/L — SIGNIFICANT CHANGE UP (ref 3.5–5.3)
POTASSIUM SERPL-SCNC: 4.3 MMOL/L — SIGNIFICANT CHANGE UP (ref 3.5–5.3)
PROT SERPL-MCNC: 7.4 GM/DL — SIGNIFICANT CHANGE UP (ref 6–8.3)
PROT UR-MCNC: NEGATIVE MG/DL — SIGNIFICANT CHANGE UP
PROTHROM AB SERPL-ACNC: 10.2 SEC — SIGNIFICANT CHANGE UP (ref 9.9–13.4)
RBC # BLD: 4.02 M/UL — SIGNIFICANT CHANGE UP (ref 3.8–5.2)
RBC # FLD: 13.3 % — SIGNIFICANT CHANGE UP (ref 10.3–14.5)
RBC CASTS # UR COMP ASSIST: 1 /HPF — SIGNIFICANT CHANGE UP (ref 0–4)
SODIUM SERPL-SCNC: 136 MMOL/L — SIGNIFICANT CHANGE UP (ref 135–145)
SP GR SPEC: 1.02 — SIGNIFICANT CHANGE UP (ref 1–1.03)
SQUAMOUS # UR AUTO: 3 /HPF — SIGNIFICANT CHANGE UP (ref 0–5)
UROBILINOGEN FLD QL: 0.2 MG/DL — SIGNIFICANT CHANGE UP (ref 0.2–1)
WBC # BLD: 8.29 K/UL — SIGNIFICANT CHANGE UP (ref 3.8–10.5)
WBC # FLD AUTO: 8.29 K/UL — SIGNIFICANT CHANGE UP (ref 3.8–10.5)
WBC UR QL: 54 /HPF — HIGH (ref 0–5)

## 2025-07-10 PROCEDURE — 70450 CT HEAD/BRAIN W/O DYE: CPT | Mod: 26

## 2025-07-10 PROCEDURE — 36415 COLL VENOUS BLD VENIPUNCTURE: CPT

## 2025-07-10 PROCEDURE — 71045 X-RAY EXAM CHEST 1 VIEW: CPT

## 2025-07-10 PROCEDURE — 85025 COMPLETE CBC W/AUTO DIFF WBC: CPT

## 2025-07-10 PROCEDURE — 99285 EMERGENCY DEPT VISIT HI MDM: CPT

## 2025-07-10 PROCEDURE — 82962 GLUCOSE BLOOD TEST: CPT

## 2025-07-10 PROCEDURE — 99291 CRITICAL CARE FIRST HOUR: CPT

## 2025-07-10 PROCEDURE — 72125 CT NECK SPINE W/O DYE: CPT

## 2025-07-10 PROCEDURE — 74177 CT ABD & PELVIS W/CONTRAST: CPT

## 2025-07-10 PROCEDURE — 70450 CT HEAD/BRAIN W/O DYE: CPT

## 2025-07-10 PROCEDURE — 72170 X-RAY EXAM OF PELVIS: CPT | Mod: 26

## 2025-07-10 PROCEDURE — 74177 CT ABD & PELVIS W/CONTRAST: CPT | Mod: 26

## 2025-07-10 PROCEDURE — 85610 PROTHROMBIN TIME: CPT

## 2025-07-10 PROCEDURE — 71260 CT THORAX DX C+: CPT

## 2025-07-10 PROCEDURE — 72125 CT NECK SPINE W/O DYE: CPT | Mod: 26

## 2025-07-10 PROCEDURE — 81001 URINALYSIS AUTO W/SCOPE: CPT

## 2025-07-10 PROCEDURE — 85730 THROMBOPLASTIN TIME PARTIAL: CPT

## 2025-07-10 PROCEDURE — 80053 COMPREHEN METABOLIC PANEL: CPT

## 2025-07-10 PROCEDURE — 72170 X-RAY EXAM OF PELVIS: CPT

## 2025-07-10 PROCEDURE — 80307 DRUG TEST PRSMV CHEM ANLYZR: CPT

## 2025-07-10 PROCEDURE — 71260 CT THORAX DX C+: CPT | Mod: 26

## 2025-07-10 PROCEDURE — 83605 ASSAY OF LACTIC ACID: CPT

## 2025-07-10 PROCEDURE — 96374 THER/PROPH/DIAG INJ IV PUSH: CPT | Mod: XU

## 2025-07-10 PROCEDURE — 87040 BLOOD CULTURE FOR BACTERIA: CPT

## 2025-07-10 PROCEDURE — 99284 EMERGENCY DEPT VISIT MOD MDM: CPT | Mod: 25

## 2025-07-10 PROCEDURE — 71045 X-RAY EXAM CHEST 1 VIEW: CPT | Mod: 26

## 2025-07-10 PROCEDURE — 83690 ASSAY OF LIPASE: CPT

## 2025-07-10 RX ORDER — CEFUROXIME SODIUM 1.5 G
1 VIAL (EA) INJECTION
Qty: 14 | Refills: 0
Start: 2025-07-10 | End: 2025-07-16

## 2025-07-10 RX ORDER — CEFTRIAXONE 500 MG/1
1000 INJECTION, POWDER, FOR SOLUTION INTRAMUSCULAR; INTRAVENOUS ONCE
Refills: 0 | Status: DISCONTINUED | OUTPATIENT
Start: 2025-07-10 | End: 2025-07-10

## 2025-07-10 RX ORDER — CEFTRIAXONE 500 MG/1
1000 INJECTION, POWDER, FOR SOLUTION INTRAMUSCULAR; INTRAVENOUS ONCE
Refills: 0 | Status: COMPLETED | OUTPATIENT
Start: 2025-07-10 | End: 2025-07-10

## 2025-07-10 RX ADMIN — CEFTRIAXONE 1000 MILLIGRAM(S): 500 INJECTION, POWDER, FOR SOLUTION INTRAMUSCULAR; INTRAVENOUS at 20:23

## 2025-07-10 NOTE — CONSULT NOTE ADULT - ATTENDING COMMENTS
89 yo F BIBEMS fall from [facility] s/p fall with left frontal scalp/forehead hematoma.   Found down for unknown period of time. Obvious signs of head injury, unknown LOC. No AC. HX of cardiac issues as per ems.    On exam, HR 88, /73  WBC 8.29  H&H 12.4/38.4    CT HEAD:  No acute intracranial hemorrhage, mass effect, or midline shift. Left frontal scalp/forehead hematoma without underlying calvarial fracture.    CT CERVICAL SPINE:  No acute fracture or traumatic subluxation.  Multi-level degenerative changes.    CT CAP:  No acute pulmonary process. No acute intra-abdominal pathology. Multiple age indeterminate thoracolumbar compression deformities.    UA + for nitrite, leukocyte esterase and many bacterias    Recommendations:  No acute trauma intervention needed; pan scan negative  Assess for signs and symptoms of UTI and treat adequately  Cleared from trauma standpoint for dispo per ED  Will follow for tertiary exam in the morning if admitted

## 2025-07-10 NOTE — ED ADULT TRIAGE NOTE - CHIEF COMPLAINT QUOTE
Pt coming in for fall from [facility]. HX of cardiac issues as per ems. Pt is A&OXO. Pt is unconscious. Code ivan called at 17:48. Fall was unwitnessed, unknown loc, no blood thinners. Unknown allergies. Pt sent into trauma 3. MD Hameed aware.

## 2025-07-10 NOTE — ED PROVIDER NOTE - CLINICAL SUMMARY MEDICAL DECISION MAKING FREE TEXT BOX
Ddx: Ro ICH/ Fractures/ Occult infection  Plan: Cbc, cmp, ct head, trauma activation, trauma scans, reassess

## 2025-07-10 NOTE — ED PROVIDER NOTE - PATIENT PORTAL LINK FT
You can access the FollowMyHealth Patient Portal offered by [facility] by registering at the following website: [email or web address]. By joining [employer]’s FollowMyHealth portal, you will also be able to view your health information using other applications (apps) compatible with our system.

## 2025-07-10 NOTE — CONSULT NOTE ADULT - SUBJECTIVE AND OBJECTIVE BOX
Tier level: Code trauma  Time of Activation: 1748   Time of Patient Arrival: 1748   Time of Surgeon Arrival: 1748   Trauma Surgeon: Jose        CC:Patient is a 89y old  Female who presents with a chief complaint of fall at home    Subjective:  89 yo F BIBEMS fall from [facility] after fall. Found down for unknown period of time. Obvious signs of head injury, unknown LOC. No AC. HX of cardiac issues as per ems. Pt is A&OXO.  Unknown allergies.    Pt seen and examined at bedside with chaperone. Pt is AAOx0, pt in no acute distress. Pt denied c/o fever, chills, chest pain, SOB, abd pain, N/V/D, extremity pain or dysfunction, hemoptysis, hematemesis, hematuria, hematochezia headache, diplopia, vertigo, dizziness Pt tolerating diet, (+) void, (+) ambulation, (+) bowel function    ROS: as above    PMH: as above  PSH: as above  No Known Allergies    SH:   FH:     Vital Signs Last 24 Hrs  T(C): 36.5 (10 Jul 2025 17:54), Max: 36.5 (10 Jul 2025 17:54)  T(F): 97.7 (10 Jul 2025 17:54), Max: 97.7 (10 Jul 2025 17:54)  HR: 73 (10 Jul 2025 19:00) (73 - 86)  BP: 181/64 (10 Jul 2025 19:00) (181/64 - 211/73)  BP(mean): 113 (10 Jul 2025 17:54) (113 - 113)  RR: 12 (10 Jul 2025 19:00) (12 - 12)  SpO2: 100% (10 Jul 2025 19:00) (95% - 100%)    Parameters below as of 10 Jul 2025 19:00  Patient On (Oxygen Delivery Method): room air        Labs:             12.4   8.29  )-----------( 251      ( 10 Jul 2025 17:56 )             38.4     CBC Full  -  ( 10 Jul 2025 17:56 )  WBC Count : 8.29 K/uL  RBC Count : 4.02 M/uL  Hemoglobin : 12.4 g/dL  Hematocrit : 38.4 %  Platelet Count - Automated : 251 K/uL  Mean Cell Volume : 95.5 fl  Mean Cell Hemoglobin : 30.8 pg  Mean Cell Hemoglobin Concentration : 32.3 g/dL  Auto Neutrophil # : 5.61 K/uL  Auto Lymphocyte # : 1.71 K/uL  Auto Monocyte # : 0.79 K/uL  Auto Eosinophil # : 0.12 K/uL  Auto Basophil # : 0.03 K/uL  Auto Neutrophil % : 67.7 %  Auto Lymphocyte % : 20.6 %  Auto Monocyte % : 9.5 %  Auto Eosinophil % : 1.4 %  Auto Basophil % : 0.4 %    07-10    136  |  105  |  28[H]  ----------------------------<  103[H]  4.3   |  27  |  0.98    Ca    9.5      10 Jul 2025 17:56    TPro  7.4  /  Alb  3.7  /  TBili  0.4  /  DBili  x   /  AST  16  /  ALT  16  /  AlkPhos  68  07-10    LIVER FUNCTIONS - ( 10 Jul 2025 17:56 )  Alb: 3.7 g/dL / Pro: 7.4 gm/dL / ALK PHOS: 68 U/L / ALT: 16 U/L / AST: 16 U/L / GGT: x           PT/INR - ( 10 Jul 2025 17:56 )   PT: 10.2 sec;   INR: 0.86 ratio         PTT - ( 10 Jul 2025 17:56 )  PTT:23.1 sec      Meds:      Radiology:  < from: CT Head No Cont (07.10.25 @ 18:15) >  IMPRESSION:    CT HEAD:  No acute intracranial hemorrhage, mass effect, or midline shift.    Left frontal scalp/forehead hematoma without underlying calvarial   fracture.    CT CERVICAL SPINE:  No acute fracture or traumatic subluxation.    Multi-level degenerative changes.    < end of copied text >      < from: CT Cervical Spine No Cont (07.10.25 @ 18:15) >  IMPRESSION:    CT HEAD:  No acute intracranial hemorrhage, mass effect, or midline shift.    Left frontal scalp/forehead hematoma without underlying calvarial   fracture.    CT CERVICAL SPINE:  No acute fracture or traumatic subluxation.    Multi-level degenerative changes.    < end of copied text >      < from: CT Chest w/ IV Cont (07.10.25 @ 18:16) >  IMPRESSION:    No acute pulmonary process.    No acute intra-abdominal pathology.    Multiple age indeterminate thoracolumbar compression deformities.    Other findings as discussed above.    < end of copied text >        Physical exam:  GCS of 15  Airway is patent  Breathing is symmetric and unlabored  Neuro: CNII-XII grossly intact  Psych: normal affect  HEENT: Normocephalic, atraumatic, ANISHA, EOM wnl, no otorrhea or hemotympanum b/l, no epistaxis or d/c b/l nares, no craniofacial bony pathology or tenderness b/l  Neck: Pt in hard cervical collar at time of exam. No crepitus, no ecchymosis, no hematoma, to exam, no JVD, no tracheal deviation  Cspine/thoracolumbrosacral spine: no gross bony pathology or tenderness to exam  Cardiovascular: S1S2 Present  Chest: no gross rib pathology or tenderness to exam. No sternal pathology or tenderness to exam. No crepitus, no ecchymosis, no hematoma. No penetrating thorcoabdominal trauma  Respiratory: Rate is 18; Respiratory Effort normal; no wheezes, rales or rhonchi to exam  ABD: bowel sounds (+), soft, nontender, non distended, no rebound, no guarding, no rigidity, no skin changes to exam. No pelvic instability to exam, no skin changes  Rectal: anal sphincter tone normal, guiac negative  Genitourinary: No scrotal/perineal/perirectal hematoma/ecchymosis/tenderness to exam  External genitalia: normal, no blood at urethral meatus  Musculoskeletal: Pt has palpable b/l radial, femoral, dorsalis pedis pulses. All digits are warm and well perfused. No gross long bone pathology or tenderness to exam. Pt demonstrates grossly intact sensoromotor function. Pt has good capillary refill to digits, no calf edema or tenderness to exam.  Skin: no lesions or rashes to exam       Tier level: Code trauma  Time of Activation: 1748   Time of Patient Arrival: 1748   Time of Surgeon Arrival: 1748   Trauma Surgeon: Jose    Patient is a 89y old  Female who presents with a chief complaint of fall at home    Subjective:  89 yo F BIBEMS fall from [facility] after fall. Found down for unknown period of time. Obvious signs of head injury, unknown LOC. No AC. HX of cardiac issues as per ems. Pt is A&OXO.  Unknown allergies.    Pt seen and examined at bedside with chaperone. Pt is AAOx0, pt in no acute distress. Pt denied c/o fever, chills, chest pain, SOB, abd pain, N/V/D, extremity pain or dysfunction, hemoptysis, hematemesis, hematuria, hematochezia headache, diplopia, vertigo, dizziness Pt tolerating diet, (+) void, (+) ambulation, (+) bowel function    ROS: as above    PMH: as above  PSH: as above  No Known Allergies    SH:   FH:     Vital Signs Last 24 Hrs  T(C): 36.5 (10 Jul 2025 17:54), Max: 36.5 (10 Jul 2025 17:54)  T(F): 97.7 (10 Jul 2025 17:54), Max: 97.7 (10 Jul 2025 17:54)  HR: 73 (10 Jul 2025 19:00) (73 - 86)  BP: 181/64 (10 Jul 2025 19:00) (181/64 - 211/73)  BP(mean): 113 (10 Jul 2025 17:54) (113 - 113)  RR: 12 (10 Jul 2025 19:00) (12 - 12)  SpO2: 100% (10 Jul 2025 19:00) (95% - 100%)    Parameters below as of 10 Jul 2025 19:00  Patient On (Oxygen Delivery Method): room air        Labs:             12.4   8.29  )-----------( 251      ( 10 Jul 2025 17:56 )             38.4     CBC Full  -  ( 10 Jul 2025 17:56 )  WBC Count : 8.29 K/uL  RBC Count : 4.02 M/uL  Hemoglobin : 12.4 g/dL  Hematocrit : 38.4 %  Platelet Count - Automated : 251 K/uL  Mean Cell Volume : 95.5 fl  Mean Cell Hemoglobin : 30.8 pg  Mean Cell Hemoglobin Concentration : 32.3 g/dL  Auto Neutrophil # : 5.61 K/uL  Auto Lymphocyte # : 1.71 K/uL  Auto Monocyte # : 0.79 K/uL  Auto Eosinophil # : 0.12 K/uL  Auto Basophil # : 0.03 K/uL  Auto Neutrophil % : 67.7 %  Auto Lymphocyte % : 20.6 %  Auto Monocyte % : 9.5 %  Auto Eosinophil % : 1.4 %  Auto Basophil % : 0.4 %    07-10    136  |  105  |  28[H]  ----------------------------<  103[H]  4.3   |  27  |  0.98    Ca    9.5      10 Jul 2025 17:56    TPro  7.4  /  Alb  3.7  /  TBili  0.4  /  DBili  x   /  AST  16  /  ALT  16  /  AlkPhos  68  07-10    LIVER FUNCTIONS - ( 10 Jul 2025 17:56 )  Alb: 3.7 g/dL / Pro: 7.4 gm/dL / ALK PHOS: 68 U/L / ALT: 16 U/L / AST: 16 U/L / GGT: x           PT/INR - ( 10 Jul 2025 17:56 )   PT: 10.2 sec;   INR: 0.86 ratio         PTT - ( 10 Jul 2025 17:56 )  PTT:23.1 sec      Meds:      Radiology:  < from: CT Head No Cont (07.10.25 @ 18:15) >  IMPRESSION:    CT HEAD:  No acute intracranial hemorrhage, mass effect, or midline shift.    Left frontal scalp/forehead hematoma without underlying calvarial   fracture.    CT CERVICAL SPINE:  No acute fracture or traumatic subluxation.    Multi-level degenerative changes.    < end of copied text >      < from: CT Cervical Spine No Cont (07.10.25 @ 18:15) >  IMPRESSION:    CT HEAD:  No acute intracranial hemorrhage, mass effect, or midline shift.    Left frontal scalp/forehead hematoma without underlying calvarial   fracture.    CT CERVICAL SPINE:  No acute fracture or traumatic subluxation.    Multi-level degenerative changes.    < end of copied text >      < from: CT Chest w/ IV Cont (07.10.25 @ 18:16) >  IMPRESSION:    No acute pulmonary process.    No acute intra-abdominal pathology.    Multiple age indeterminate thoracolumbar compression deformities.    Other findings as discussed above.    < end of copied text >        Physical exam:  GCS of 14  Airway is patent  Breathing is symmetric and unlabored  Neuro: CNII-XII grossly intact  Psych: Confused  HEENT: Normocephalic, EOM wnl, no otorrhea or hemotympanum b/l, no epistaxis or d/c b/l nares, no craniofacial bony pathology. Left forehead hematoma with bruising and pinpoint skin break, non tender.  Neck: Pt in hard cervical collar at time of exam. No crepitus, no ecchymosis, no hematoma, to exam, no JVD, no tracheal deviation  Cspine/thoracolumbrosacral spine: no gross bony pathology or tenderness to exam  Cardiovascular: S1S2 Present  Chest: no gross rib pathology or tenderness to exam. No sternal pathology or tenderness to exam. No crepitus, no ecchymosis, no hematoma. No penetrating thorcoabdominal trauma  Respiratory: Rate is 18; Respiratory Effort normal; no wheezes, rales or rhonchi to exam  ABD: bowel sounds (+), soft, nontender, non distended, no rebound, no guarding, no rigidity, no skin changes to exam. No pelvic instability to exam, no skin changes  Rectal: anal sphincter tone normal  Genitourinary: No scrotal/perineal/perirectal hematoma/ecchymosis/tenderness to exam  External genitalia: normal, no blood at urethral meatus  Musculoskeletal: Pt has palpable b/l radial, femoral, dorsalis pedis pulses. All digits are warm and well perfused. No gross long bone pathology or tenderness to exam. Pt demonstrates grossly intact sensoromotor function. No calf edema or tenderness to exam.   Skin: no lesions or rashes to exam. Multiple ecchymosis along the body.

## 2025-07-10 NOTE — ED PROVIDER NOTE - PROGRESS NOTE DETAILS
Pt cleared by trauma, no fractures. Found to have UTI. Offered admission to pt and daughter, adamantly declines, and prefers to return to nursing facility. Return precautions provided, pt ready for d/c.

## 2025-07-10 NOTE — ED ADULT NURSE NOTE - CHIEF COMPLAINT QUOTE
Pt coming in for fall from [facility]. HX of cardiac issues as per ems. Pt is A&OXO. Pt is unconscious. Code ivan called at 17:48. Fall was unwitnessed, unknown loc, no blood thinners. Unknown allergies. Pt sent into trauma 3. MD Hameed aware.

## 2025-07-10 NOTE — CONSULT NOTE ADULT - ASSESSMENT
89 yo F BIBEMS fall from [facility] after fall. Found down for unknown period of time. Obvious signs of head injury, unknown LOC. No AC. HX of cardiac issues as per ems.      Pan scan negative for any acute traumatic injuries  UA + for nitrite, leukocyte esterase and many bacterias  No leukocytosis    P:  - No acute trauma intervention needed; pan scan negative  - Assess for signs and symptoms of UTI and treat adequately  - Dispo per ED    Plan discussed with Dr Garcia 89 yo F BIBEMS fall from [facility] s/p fall. Found down for unknown period of time. Obvious signs of head injury, unknown LOC. No AC. HX of cardiac issues as per ems.      Pan scan negative for any acute traumatic injuries  UA + for nitrite, leukocyte esterase and many bacterias  No leukocytosis    Recommendations:  - No acute trauma intervention needed; pan scan negative  - Assess for signs and symptoms of UTI and treat adequately  - Cleared from trauma standpoint for dispo per ED    Plan discussed with Dr Garcia and trauma team 89 yo F BIBEMS fall from [facility] s/p fall with left frontal scalp/forehead hematoma.   Found down for unknown period of time. Obvious signs of head injury, unknown LOC. No AC. HX of cardiac issues as per ems.    On exam, HR 88, /73  WBC 8.29  H&H 12.4/38.4    CT HEAD:  No acute intracranial hemorrhage, mass effect, or midline shift. Left frontal scalp/forehead hematoma without underlying calvarial fracture.    CT CERVICAL SPINE:  No acute fracture or traumatic subluxation.  Multi-level degenerative changes.    CT CAP:  No acute pulmonary process. No acute intra-abdominal pathology. Multiple age indeterminate thoracolumbar compression deformities.    UA + for nitrite, leukocyte esterase and many bacterias    Recommendations:  - No acute trauma intervention needed; pan scan negative  - Assess for signs and symptoms of UTI and treat adequately  - Cleared from trauma standpoint for dispo per ED    Plan discussed with Dr Garcia and trauma team

## 2025-07-10 NOTE — ED PROVIDER NOTE - OBJECTIVE STATEMENT
Patient is a 89-year-old lady with  past medical history of hypertension who presents to the ED as a code trauma because of an unwitnessed fall from her nursing home.  Patient reportedly had a presumed fall from her bed.  Unknown head strike, was initially difficult to arouse.  Here in ED, patient is awake, and loudly saying that she just wants to die.  Daughter says this is her baseline.

## 2025-07-11 VITALS
DIASTOLIC BLOOD PRESSURE: 62 MMHG | RESPIRATION RATE: 21 BRPM | SYSTOLIC BLOOD PRESSURE: 147 MMHG | TEMPERATURE: 98 F | OXYGEN SATURATION: 98 % | HEART RATE: 71 BPM

## 2025-07-16 LAB
CULTURE RESULTS: SIGNIFICANT CHANGE UP
CULTURE RESULTS: SIGNIFICANT CHANGE UP
SPECIMEN SOURCE: SIGNIFICANT CHANGE UP
SPECIMEN SOURCE: SIGNIFICANT CHANGE UP